# Patient Record
Sex: MALE | Race: OTHER | HISPANIC OR LATINO | URBAN - METROPOLITAN AREA
[De-identification: names, ages, dates, MRNs, and addresses within clinical notes are randomized per-mention and may not be internally consistent; named-entity substitution may affect disease eponyms.]

---

## 2020-06-25 ENCOUNTER — INPATIENT (INPATIENT)
Facility: HOSPITAL | Age: 25
LOS: 14 days | Discharge: HOME | End: 2020-07-10
Attending: PSYCHIATRY & NEUROLOGY | Admitting: PSYCHIATRY & NEUROLOGY
Payer: MEDICAID

## 2020-06-25 VITALS
TEMPERATURE: 98 F | SYSTOLIC BLOOD PRESSURE: 155 MMHG | OXYGEN SATURATION: 99 % | HEART RATE: 90 BPM | DIASTOLIC BLOOD PRESSURE: 94 MMHG | RESPIRATION RATE: 19 BRPM

## 2020-06-25 DIAGNOSIS — F31.9 BIPOLAR DISORDER, UNSPECIFIED: ICD-10-CM

## 2020-06-25 LAB
ANION GAP SERPL CALC-SCNC: 15 MMO/L — HIGH (ref 7–14)
APAP SERPL-MCNC: < 15 UG/ML — LOW (ref 15–25)
BASOPHILS # BLD AUTO: 0.06 K/UL — SIGNIFICANT CHANGE UP (ref 0–0.2)
BASOPHILS NFR BLD AUTO: 0.5 % — SIGNIFICANT CHANGE UP (ref 0–2)
BUN SERPL-MCNC: 17 MG/DL — SIGNIFICANT CHANGE UP (ref 7–23)
CALCIUM SERPL-MCNC: 9.3 MG/DL — SIGNIFICANT CHANGE UP (ref 8.4–10.5)
CHLORIDE SERPL-SCNC: 100 MMOL/L — SIGNIFICANT CHANGE UP (ref 98–107)
CO2 SERPL-SCNC: 21 MMOL/L — LOW (ref 22–31)
CREAT SERPL-MCNC: 0.95 MG/DL — SIGNIFICANT CHANGE UP (ref 0.5–1.3)
EOSINOPHIL # BLD AUTO: 0.11 K/UL — SIGNIFICANT CHANGE UP (ref 0–0.5)
EOSINOPHIL NFR BLD AUTO: 0.8 % — SIGNIFICANT CHANGE UP (ref 0–6)
ETHANOL BLD-MCNC: < 10 MG/DL — SIGNIFICANT CHANGE UP
GLUCOSE SERPL-MCNC: 148 MG/DL — HIGH (ref 70–99)
HCT VFR BLD CALC: 44.5 % — SIGNIFICANT CHANGE UP (ref 39–50)
HGB BLD-MCNC: 15.6 G/DL — SIGNIFICANT CHANGE UP (ref 13–17)
IMM GRANULOCYTES NFR BLD AUTO: 0.8 % — SIGNIFICANT CHANGE UP (ref 0–1.5)
LYMPHOCYTES # BLD AUTO: 17.8 % — SIGNIFICANT CHANGE UP (ref 13–44)
LYMPHOCYTES # BLD AUTO: 2.31 K/UL — SIGNIFICANT CHANGE UP (ref 1–3.3)
MCHC RBC-ENTMCNC: 32.4 PG — SIGNIFICANT CHANGE UP (ref 27–34)
MCHC RBC-ENTMCNC: 35.1 % — SIGNIFICANT CHANGE UP (ref 32–36)
MCV RBC AUTO: 92.3 FL — SIGNIFICANT CHANGE UP (ref 80–100)
MONOCYTES # BLD AUTO: 1.06 K/UL — HIGH (ref 0–0.9)
MONOCYTES NFR BLD AUTO: 8.2 % — SIGNIFICANT CHANGE UP (ref 2–14)
NEUTROPHILS # BLD AUTO: 9.31 K/UL — HIGH (ref 1.8–7.4)
NEUTROPHILS NFR BLD AUTO: 71.9 % — SIGNIFICANT CHANGE UP (ref 43–77)
NRBC # FLD: 0 K/UL — SIGNIFICANT CHANGE UP (ref 0–0)
PLATELET # BLD AUTO: 134 K/UL — LOW (ref 150–400)
PMV BLD: 12.5 FL — SIGNIFICANT CHANGE UP (ref 7–13)
POTASSIUM SERPL-MCNC: 3.9 MMOL/L — SIGNIFICANT CHANGE UP (ref 3.5–5.3)
POTASSIUM SERPL-SCNC: 3.9 MMOL/L — SIGNIFICANT CHANGE UP (ref 3.5–5.3)
RBC # BLD: 4.82 M/UL — SIGNIFICANT CHANGE UP (ref 4.2–5.8)
RBC # FLD: 12.2 % — SIGNIFICANT CHANGE UP (ref 10.3–14.5)
SALICYLATES SERPL-MCNC: < 5 MG/DL — LOW (ref 15–30)
SODIUM SERPL-SCNC: 136 MMOL/L — SIGNIFICANT CHANGE UP (ref 135–145)
TSH SERPL-MCNC: 1.37 UIU/ML — SIGNIFICANT CHANGE UP (ref 0.27–4.2)
WBC # BLD: 12.95 K/UL — HIGH (ref 3.8–10.5)
WBC # FLD AUTO: 12.95 K/UL — HIGH (ref 3.8–10.5)

## 2020-06-25 PROCEDURE — 99285 EMERGENCY DEPT VISIT HI MDM: CPT

## 2020-06-25 RX ORDER — HALOPERIDOL DECANOATE 100 MG/ML
5 INJECTION INTRAMUSCULAR EVERY 6 HOURS
Refills: 0 | Status: DISCONTINUED | OUTPATIENT
Start: 2020-06-26 | End: 2020-07-10

## 2020-06-25 RX ORDER — HALOPERIDOL DECANOATE 100 MG/ML
5 INJECTION INTRAMUSCULAR ONCE
Refills: 0 | Status: COMPLETED | OUTPATIENT
Start: 2020-06-25 | End: 2020-06-25

## 2020-06-25 RX ORDER — DIPHENHYDRAMINE HCL 50 MG
50 CAPSULE ORAL EVERY 6 HOURS
Refills: 0 | Status: DISCONTINUED | OUTPATIENT
Start: 2020-06-26 | End: 2020-07-10

## 2020-06-25 RX ORDER — TETANUS TOXOID, REDUCED DIPHTHERIA TOXOID AND ACELLULAR PERTUSSIS VACCINE, ADSORBED 5; 2.5; 8; 8; 2.5 [IU]/.5ML; [IU]/.5ML; UG/.5ML; UG/.5ML; UG/.5ML
0.5 SUSPENSION INTRAMUSCULAR ONCE
Refills: 0 | Status: COMPLETED | OUTPATIENT
Start: 2020-06-25 | End: 2020-06-25

## 2020-06-25 RX ORDER — DIPHENHYDRAMINE HCL 50 MG
50 CAPSULE ORAL ONCE
Refills: 0 | Status: DISCONTINUED | OUTPATIENT
Start: 2020-06-26 | End: 2020-07-10

## 2020-06-25 RX ORDER — HALOPERIDOL DECANOATE 100 MG/ML
5 INJECTION INTRAMUSCULAR ONCE
Refills: 0 | Status: DISCONTINUED | OUTPATIENT
Start: 2020-06-26 | End: 2020-07-10

## 2020-06-25 RX ADMIN — TETANUS TOXOID, REDUCED DIPHTHERIA TOXOID AND ACELLULAR PERTUSSIS VACCINE, ADSORBED 0.5 MILLILITER(S): 5; 2.5; 8; 8; 2.5 SUSPENSION INTRAMUSCULAR at 19:09

## 2020-06-25 RX ADMIN — HALOPERIDOL DECANOATE 5 MILLIGRAM(S): 100 INJECTION INTRAMUSCULAR at 17:30

## 2020-06-25 NOTE — ED PROVIDER NOTE - SKIN COLOR
Multiple (6+) areas of skin abrasion/excoriation/superficial laceration over bilateral arms and 1 to R lateral thigh with various shapes/designs.  No active bleeding, crusting of small volumes of blood c/w recent active ooze.  No areas of sig depth warranting suture.  PT also has 2 linear wounds to R brow overlying R brow with extension to ~0.5 cm inferior to brow without involvement of superior lid.  EOMI.

## 2020-06-25 NOTE — ED PROVIDER NOTE - CARE PLAN
Principal Discharge DX:	Behavior concern in adult Principal Discharge DX:	Behavior concern in adult  Secondary Diagnosis:	Psychosis
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
## 2020-06-25 NOTE — ED BEHAVIORAL HEALTH NOTE - BEHAVIORAL HEALTH NOTE
Worker called patient’s father Gabriel Sykes ([phone]) for collateral information. All information is as per Mr. 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 Mr. Sykes states that the patient lives alone in a rented room and then recently reconnected with the family 1 year ago. He states prior to this the patient was away from the family for three years because he was drinking alcohol and smoking marijuana with friends and was caught up in the wrong company. Father states that the patient was hospitalized in January at [facility] for a 2 week admission for inpatient psych. Father States at that time the patient was acting bizarre and was disagreeing with all the family members in the house. Father states in February the patient moved into the room. He states that the patient is currently not mentally stable and needs to be on medication. He states that the patient is not connected to any outpatient services and is not on medication. He states that the patient has been recently displaying a change in moods. He states that about 16 days ago the patient asked him, “why were you sending people to kill me?”. He states that the patient reported hearing voices every night and that he is seeing people around his room. He states that the patient is telling him that he is seeing family members who are not in the country. He states that the patient is increasingly paranoid about people. He states that the patient has been laughing to himself and talking to himself. He states that the patient is not aggressive or violent. He states that the patient cut his upper left eye and nose and was cutting numbers in his hand. Father questioned him doing this and the patient responded that was his magic number. Father states that the patient does not trust anyone. He states that he told the patient to stop doing this and abusing his body at that time. He states he takes the patient breakfast and lunch every day. He states that he brought the patient breakfast today at 9am and noticed the patient’s finger was bleeding. He states that he told the patient to stop hurting himself. He states that when he later returned to bring him lunch the patient cut his entire body. He states that he could not believe what the patient had done to his body. He states that the patient cut himself on his face, arms, and legs. He states he then activated 911. He believes the patient needs to be on medication and is current danger to himself. Father states that he patient saw a counselor in high school. He reports that the patient smokes cigarettes and marijuana. Father denies that the patient mentioned suicidal statements today.  He states that the patient’s brother also has bipolar disorder. He denies that the patient has legal issues or medical problems. He denies that the patient has a SA. He states that no one besides him in the family knows what happened today.  Case discussed with Dr. Brandon. Patient will be admitted to L[facility]. Worker informed father of patient admission to L6 and provided unit numbers. medical clearance pending.

## 2020-06-25 NOTE — ED PROVIDER NOTE - UNABLE TO OBTAIN
Unresponsive Hx as able to be obtained in HPI, pt otherwise avoidant, please refer to HPI for what was able to be obtained

## 2020-06-25 NOTE — ED ADULT NURSE NOTE - OBJECTIVE STATEMENT
Pt arrives agitated and oppositional, meds given as ordered.  Father called 911 and pt arrives in handcuffs for pt cutting himself and is suicidal.  Pt calmed with meds and agreed to testing.  States he's going through a lot of stuff but unwilling to say.  Meal provided.  Emotional support given.  Safety precautions taken.  Belongings secured.  Pt with multiple superficial wounds all over body.  No active bleeding.

## 2020-06-25 NOTE — ED BEHAVIORAL HEALTH ASSESSMENT NOTE - SUMMARY
Patient is a 25 year old Maltese man, with past diagnosis of mood disorder, ADHD, Oppositional defiant disorder, Disruptive behavior disorder, THC dependence, Bipolar disorder, MDD, Panic disorder, with one hospitalization from [facility] in Jan 2020, no history of suicide attempts, recent history of self mutilation, not currently in treatment, who presents after worsening self mutilation and internal preoccupation.     Patient is an acute danger to self and others at this time.  Patient lacks insight and judgment into illness and remains an acute safety risk and warrants inpatient psychiatric hospitalization for safety and stabilization.

## 2020-06-25 NOTE — ED BEHAVIORAL HEALTH ASSESSMENT NOTE - DESCRIPTION
Patient was pacing, intrusive, did not exhibit any aggression. Patient did not require any physical restraints.     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

## 2020-06-25 NOTE — ED PROVIDER NOTE - PROGRESS NOTE DETAILS
Attending MD Verde.  PT endorsed to ALFREDO Robbins pending psych recs in stable condition. Patient received from Dr. Verde.  Patient stable, pending recs.  Calm and cooperative. DD ED ATTG:  rec'd s/o from Dr Verde - 25M cut with glass excoriations.  tba .  .  d/w Dr Gale - OK to admit pt to low 6 pending covid swab result.

## 2020-06-25 NOTE — ED PROVIDER NOTE - OBJECTIVE STATEMENT
Attending MD Verde.  Pt is a 26 yo male with reported pmhx of bipolar disorder for which he states he is supposed to be on medication but doesn't know what and states that he hasn't taken anything in at least 1 mo but unsure how long.  Per pt he doesn't need to be here but dad called.  Pt with visible areas of skin scratching/abrasion/superifcial laceration in various designs with acute on chronic appearance with some dried blood c/w recent active low volume ooze, areas are dry, no active bleeding currently.  Areas in question on dorsum of bilateral arms and to R brow without involvement of the R eye.  Pt endorses additional site to R thigh but is in hallway on initial exam. Pt denies any desire or attempt to harm self in the past and denies any desire or attempt to harm anyone else previously.  PT states that current skin findings are from his 'art' not an attempt at self-harm.  Pt with some psychomotor agitation, pacing during evaluation.  Denies any drug use/tobacco use/medication.  Denies any other medical problems.  Current presentation not c/w intoxication but rather c/w paranoid/avoidant possible brooks.  Pt denies recent fevers/chills/cough/congestion, states he has not had COVID to his knowledge.  Pt does not known when he last had a tetanus shot.  Police state that wounds were made by pt cutting himself with a broken piece of glass.

## 2020-06-25 NOTE — ED BEHAVIORAL HEALTH ASSESSMENT NOTE - SUICIDE PROTECTIVE FACTORS
Supportive social network of family or friends/Engaged in work or school/Identifies reasons for living/Has future plans

## 2020-06-25 NOTE — ED ADULT NURSE NOTE - NSIMPLEMENTINTERV_GEN_ALL_ED
Implemented All Fall Risk Interventions:  Waddy to call system. Call bell, personal items and telephone within reach. Instruct patient to call for assistance. Room bathroom lighting operational. Non-slip footwear when patient is off stretcher. Physically safe environment: no spills, clutter or unnecessary equipment. Stretcher in lowest position, wheels locked, appropriate side rails in place. Provide visual cue, wrist band, yellow gown, etc. Monitor gait and stability. Monitor for mental status changes and reorient to person, place, and time. Review medications for side effects contributing to fall risk. Reinforce activity limits and safety measures with patient and family.

## 2020-06-25 NOTE — ED BEHAVIORAL HEALTH ASSESSMENT NOTE - OTHER PAST PSYCHIATRIC HISTORY (INCLUDE DETAILS REGARDING ONSET, COURSE OF ILLNESS, INPATIENT/OUTPATIENT TREATMENT)
1 inpatient hospitalization [facility] 2/20/2019 to 2/25/2019 and [facility] 3/2/2019 to 3/8/2019  [place] [facility] [place]  per claudio, previous treatment with psychiatry in 2019

## 2020-06-25 NOTE — ED PROVIDER NOTE - CLINICAL SUMMARY MEDICAL DECISION MAKING FREE TEXT BOX
Attending MD Verde.  PT with avoidant, paranoid behavior and psychomotor agitation with reported hx of bipolar disorder and med non-compliance.  Psych consult.  Psych clearance labs ordered and pt to receive tdap.  No lac repair warranted at this time.  Haldol ordered for ongoing psychomotor agitation in behavioral health.

## 2020-06-25 NOTE — ED ADULT TRIAGE NOTE - CHIEF COMPLAINT QUOTE
Patient brought to ER by EMS accompanied by MARI from home after father called to say that he has been cutting himself and suicidal. Pt has a bipolar history and has superficial wounds to bilateral arms and knees.

## 2020-06-26 DIAGNOSIS — F69 UNSPECIFIED DISORDER OF ADULT PERSONALITY AND BEHAVIOR: ICD-10-CM

## 2020-06-26 LAB
AMPHET UR-MCNC: NEGATIVE — SIGNIFICANT CHANGE UP
APPEARANCE UR: CLEAR — SIGNIFICANT CHANGE UP
BARBITURATES UR SCN-MCNC: NEGATIVE — SIGNIFICANT CHANGE UP
BENZODIAZ UR-MCNC: NEGATIVE — SIGNIFICANT CHANGE UP
BILIRUB UR-MCNC: NEGATIVE — SIGNIFICANT CHANGE UP
BLOOD UR QL VISUAL: NEGATIVE — SIGNIFICANT CHANGE UP
CANNABINOIDS UR-MCNC: NEGATIVE — SIGNIFICANT CHANGE UP
COCAINE METAB.OTHER UR-MCNC: NEGATIVE — SIGNIFICANT CHANGE UP
COLOR SPEC: SIGNIFICANT CHANGE UP
GLUCOSE UR-MCNC: NEGATIVE — SIGNIFICANT CHANGE UP
KETONES UR-MCNC: NEGATIVE — SIGNIFICANT CHANGE UP
LEUKOCYTE ESTERASE UR-ACNC: NEGATIVE — SIGNIFICANT CHANGE UP
METHADONE UR-MCNC: NEGATIVE — SIGNIFICANT CHANGE UP
NITRITE UR-MCNC: NEGATIVE — SIGNIFICANT CHANGE UP
OPIATES UR-MCNC: NEGATIVE — SIGNIFICANT CHANGE UP
OXYCODONE UR-MCNC: NEGATIVE — SIGNIFICANT CHANGE UP
PCP UR-MCNC: NEGATIVE — SIGNIFICANT CHANGE UP
PH UR: 6.5 — SIGNIFICANT CHANGE UP (ref 5–8)
PROT UR-MCNC: NEGATIVE — SIGNIFICANT CHANGE UP
SARS-COV-2 RNA SPEC QL NAA+PROBE: SIGNIFICANT CHANGE UP
SP GR SPEC: 1.01 — SIGNIFICANT CHANGE UP (ref 1–1.04)
UROBILINOGEN FLD QL: NORMAL — SIGNIFICANT CHANGE UP

## 2020-06-26 RX ORDER — RISPERIDONE 4 MG/1
2 TABLET ORAL AT BEDTIME
Refills: 0 | Status: DISCONTINUED | OUTPATIENT
Start: 2020-06-26 | End: 2020-06-28

## 2020-06-26 RX ADMIN — RISPERIDONE 2 MILLIGRAM(S): 4 TABLET ORAL at 21:11

## 2020-06-26 NOTE — ED ADULT NURSE REASSESSMENT NOTE - NS ED NURSE REASSESS COMMENT FT1
pt calm & cooperative denies si/hi/avh presently made aware of admission to  low 6, pt transported via EMS.

## 2020-06-26 NOTE — ED ADULT NURSE REASSESSMENT NOTE - GENERAL PATIENT STATE
pt observed laying in bed at this time. NAD, even unlabored resp observed. When awake, patient paces unit, looks at clock and appears internally preoccupied, minimally verbal but without agitation

## 2020-06-26 NOTE — ED ADULT NURSE REASSESSMENT NOTE - NS ED NURSE REASSESS COMMENT FT1
Received report from night RN pt calm & cooperative denies si/hi/avh presently pt cleared by psych for admission to low 6, pending Covid results safety & comfort measures maintained eval on going.

## 2020-06-26 NOTE — ED ADULT NURSE REASSESSMENT NOTE - NS ED NURSE REASSESS COMMENT FT1
FACILITATOR RN: Pt. a&ox4, resting in stretcher, appears comfortable. Respirations appear even and unlabored. VS as noted. NAD. Will continue to monitor.

## 2020-06-27 PROCEDURE — 99222 1ST HOSP IP/OBS MODERATE 55: CPT

## 2020-06-27 RX ADMIN — RISPERIDONE 2 MILLIGRAM(S): 4 TABLET ORAL at 20:29

## 2020-06-28 PROCEDURE — 99232 SBSQ HOSP IP/OBS MODERATE 35: CPT

## 2020-06-28 RX ORDER — RISPERIDONE 4 MG/1
3 TABLET ORAL AT BEDTIME
Refills: 0 | Status: DISCONTINUED | OUTPATIENT
Start: 2020-06-28 | End: 2020-06-30

## 2020-06-28 RX ORDER — BENZOCAINE AND MENTHOL 5; 1 G/100ML; G/100ML
1 LIQUID ORAL
Refills: 0 | Status: DISCONTINUED | OUTPATIENT
Start: 2020-06-28 | End: 2020-07-10

## 2020-06-28 RX ADMIN — BENZOCAINE AND MENTHOL 1 LOZENGE: 5; 1 LIQUID ORAL at 05:23

## 2020-06-28 RX ADMIN — Medication 100 MILLIGRAM(S): at 13:04

## 2020-06-28 RX ADMIN — Medication 100 MILLIGRAM(S): at 21:08

## 2020-06-28 RX ADMIN — RISPERIDONE 3 MILLIGRAM(S): 4 TABLET ORAL at 21:08

## 2020-06-29 PROCEDURE — 99232 SBSQ HOSP IP/OBS MODERATE 35: CPT

## 2020-06-29 RX ORDER — BACITRACIN ZINC 500 UNIT/G
1 OINTMENT IN PACKET (EA) TOPICAL
Refills: 0 | Status: DISCONTINUED | OUTPATIENT
Start: 2020-06-29 | End: 2020-07-10

## 2020-06-29 RX ADMIN — RISPERIDONE 3 MILLIGRAM(S): 4 TABLET ORAL at 21:49

## 2020-06-29 RX ADMIN — Medication 1 APPLICATION(S): at 21:49

## 2020-06-30 PROCEDURE — 99232 SBSQ HOSP IP/OBS MODERATE 35: CPT

## 2020-06-30 RX ORDER — RISPERIDONE 4 MG/1
4 TABLET ORAL AT BEDTIME
Refills: 0 | Status: DISCONTINUED | OUTPATIENT
Start: 2020-06-30 | End: 2020-07-02

## 2020-06-30 RX ADMIN — Medication 1 APPLICATION(S): at 10:44

## 2020-06-30 RX ADMIN — Medication 1 APPLICATION(S): at 20:10

## 2020-06-30 RX ADMIN — RISPERIDONE 4 MILLIGRAM(S): 4 TABLET ORAL at 20:30

## 2020-07-01 PROCEDURE — 99232 SBSQ HOSP IP/OBS MODERATE 35: CPT

## 2020-07-01 RX ADMIN — RISPERIDONE 4 MILLIGRAM(S): 4 TABLET ORAL at 20:11

## 2020-07-01 RX ADMIN — Medication 1 APPLICATION(S): at 10:19

## 2020-07-01 RX ADMIN — Medication 1 APPLICATION(S): at 20:11

## 2020-07-02 PROCEDURE — 99232 SBSQ HOSP IP/OBS MODERATE 35: CPT

## 2020-07-02 RX ORDER — RISPERIDONE 4 MG/1
5 TABLET ORAL AT BEDTIME
Refills: 0 | Status: DISCONTINUED | OUTPATIENT
Start: 2020-07-02 | End: 2020-07-06

## 2020-07-02 RX ORDER — LANOLIN ALCOHOL/MO/W.PET/CERES
3 CREAM (GRAM) TOPICAL AT BEDTIME
Refills: 0 | Status: DISCONTINUED | OUTPATIENT
Start: 2020-07-02 | End: 2020-07-10

## 2020-07-02 RX ADMIN — Medication 1 APPLICATION(S): at 10:29

## 2020-07-02 RX ADMIN — BENZOCAINE AND MENTHOL 1 LOZENGE: 5; 1 LIQUID ORAL at 10:29

## 2020-07-02 RX ADMIN — RISPERIDONE 5 MILLIGRAM(S): 4 TABLET ORAL at 21:59

## 2020-07-02 RX ADMIN — Medication 1 APPLICATION(S): at 21:59

## 2020-07-03 RX ADMIN — RISPERIDONE 5 MILLIGRAM(S): 4 TABLET ORAL at 21:30

## 2020-07-03 RX ADMIN — Medication 1 APPLICATION(S): at 21:30

## 2020-07-03 RX ADMIN — Medication 1 APPLICATION(S): at 09:29

## 2020-07-04 RX ADMIN — BENZOCAINE AND MENTHOL 1 LOZENGE: 5; 1 LIQUID ORAL at 08:48

## 2020-07-04 RX ADMIN — Medication 1 APPLICATION(S): at 08:47

## 2020-07-04 RX ADMIN — Medication 1 APPLICATION(S): at 20:59

## 2020-07-04 RX ADMIN — RISPERIDONE 5 MILLIGRAM(S): 4 TABLET ORAL at 20:59

## 2020-07-05 RX ADMIN — Medication 1 APPLICATION(S): at 20:54

## 2020-07-05 RX ADMIN — Medication 1 APPLICATION(S): at 09:39

## 2020-07-05 RX ADMIN — RISPERIDONE 5 MILLIGRAM(S): 4 TABLET ORAL at 20:54

## 2020-07-06 PROCEDURE — 99232 SBSQ HOSP IP/OBS MODERATE 35: CPT

## 2020-07-06 RX ORDER — RISPERIDONE 4 MG/1
6 TABLET ORAL AT BEDTIME
Refills: 0 | Status: DISCONTINUED | OUTPATIENT
Start: 2020-07-06 | End: 2020-07-10

## 2020-07-06 RX ADMIN — Medication 1 APPLICATION(S): at 21:15

## 2020-07-06 RX ADMIN — RISPERIDONE 6 MILLIGRAM(S): 4 TABLET ORAL at 21:15

## 2020-07-06 RX ADMIN — Medication 1 APPLICATION(S): at 09:12

## 2020-07-07 PROCEDURE — 99232 SBSQ HOSP IP/OBS MODERATE 35: CPT

## 2020-07-07 RX ADMIN — Medication 1 APPLICATION(S): at 12:09

## 2020-07-07 RX ADMIN — Medication 1 APPLICATION(S): at 21:25

## 2020-07-07 RX ADMIN — Medication 100 MILLIGRAM(S): at 21:27

## 2020-07-07 RX ADMIN — RISPERIDONE 6 MILLIGRAM(S): 4 TABLET ORAL at 21:24

## 2020-07-08 PROCEDURE — 99232 SBSQ HOSP IP/OBS MODERATE 35: CPT

## 2020-07-08 RX ADMIN — Medication 1 APPLICATION(S): at 09:17

## 2020-07-08 RX ADMIN — Medication 1 APPLICATION(S): at 21:20

## 2020-07-08 RX ADMIN — RISPERIDONE 6 MILLIGRAM(S): 4 TABLET ORAL at 21:20

## 2020-07-09 PROCEDURE — 99232 SBSQ HOSP IP/OBS MODERATE 35: CPT

## 2020-07-09 RX ORDER — RISPERIDONE 4 MG/1
2 TABLET ORAL
Qty: 60 | Refills: 0
Start: 2020-07-09

## 2020-07-09 RX ADMIN — BENZOCAINE AND MENTHOL 1 LOZENGE: 5; 1 LIQUID ORAL at 09:33

## 2020-07-09 RX ADMIN — Medication 3 MILLIGRAM(S): at 21:06

## 2020-07-09 RX ADMIN — RISPERIDONE 6 MILLIGRAM(S): 4 TABLET ORAL at 20:54

## 2020-07-09 RX ADMIN — Medication 1 APPLICATION(S): at 09:33

## 2020-07-09 RX ADMIN — Medication 50 MILLIGRAM(S): at 21:06

## 2020-07-09 RX ADMIN — Medication 1 APPLICATION(S): at 20:54

## 2020-07-10 VITALS — TEMPERATURE: 97 F

## 2020-07-10 RX ADMIN — Medication 1 APPLICATION(S): at 08:44

## 2020-07-10 RX ADMIN — BENZOCAINE AND MENTHOL 1 LOZENGE: 5; 1 LIQUID ORAL at 08:45

## 2020-07-10 RX ADMIN — HALOPERIDOL DECANOATE 5 MILLIGRAM(S): 100 INJECTION INTRAMUSCULAR at 08:44

## 2020-07-13 PROBLEM — Z78.9 OTHER SPECIFIED HEALTH STATUS: Chronic | Status: ACTIVE | Noted: 2020-06-25

## 2020-07-15 ENCOUNTER — OUTPATIENT (OUTPATIENT)
Dept: OUTPATIENT SERVICES | Facility: HOSPITAL | Age: 25
LOS: 1 days | Discharge: STILL ADMITTED OR ADMINISTRATIVE | End: 2020-07-15

## 2021-01-14 ENCOUNTER — INPATIENT (INPATIENT)
Facility: HOSPITAL | Age: 26
LOS: 26 days | Discharge: HOME | End: 2021-02-10
Attending: PSYCHIATRY & NEUROLOGY | Admitting: PSYCHIATRY & NEUROLOGY
Payer: MEDICAID

## 2021-01-14 VITALS
SYSTOLIC BLOOD PRESSURE: 159 MMHG | DIASTOLIC BLOOD PRESSURE: 98 MMHG | HEART RATE: 93 BPM | RESPIRATION RATE: 18 BRPM | OXYGEN SATURATION: 100 % | TEMPERATURE: 98 F

## 2021-01-14 DIAGNOSIS — F31.9 BIPOLAR DISORDER, UNSPECIFIED: ICD-10-CM

## 2021-01-14 LAB
ALBUMIN SERPL ELPH-MCNC: 4.6 G/DL — SIGNIFICANT CHANGE UP (ref 3.3–5)
ALP SERPL-CCNC: 78 U/L — SIGNIFICANT CHANGE UP (ref 40–120)
ALT FLD-CCNC: 13 U/L — SIGNIFICANT CHANGE UP (ref 4–41)
ANION GAP SERPL CALC-SCNC: 12 MMOL/L — SIGNIFICANT CHANGE UP (ref 7–14)
APPEARANCE UR: CLEAR — SIGNIFICANT CHANGE UP
AST SERPL-CCNC: 19 U/L — SIGNIFICANT CHANGE UP (ref 4–40)
BASOPHILS # BLD AUTO: 0.08 K/UL — SIGNIFICANT CHANGE UP (ref 0–0.2)
BASOPHILS NFR BLD AUTO: 0.7 % — SIGNIFICANT CHANGE UP (ref 0–2)
BILIRUB SERPL-MCNC: 0.4 MG/DL — SIGNIFICANT CHANGE UP (ref 0.2–1.2)
BILIRUB UR-MCNC: NEGATIVE — SIGNIFICANT CHANGE UP
BUN SERPL-MCNC: 12 MG/DL — SIGNIFICANT CHANGE UP (ref 7–23)
CALCIUM SERPL-MCNC: 9.7 MG/DL — SIGNIFICANT CHANGE UP (ref 8.4–10.5)
CHLORIDE SERPL-SCNC: 103 MMOL/L — SIGNIFICANT CHANGE UP (ref 98–107)
CO2 SERPL-SCNC: 26 MMOL/L — SIGNIFICANT CHANGE UP (ref 22–31)
COLOR SPEC: SIGNIFICANT CHANGE UP
CREAT SERPL-MCNC: 0.8 MG/DL — SIGNIFICANT CHANGE UP (ref 0.5–1.3)
DIFF PNL FLD: NEGATIVE — SIGNIFICANT CHANGE UP
EOSINOPHIL # BLD AUTO: 0.08 K/UL — SIGNIFICANT CHANGE UP (ref 0–0.5)
EOSINOPHIL NFR BLD AUTO: 0.7 % — SIGNIFICANT CHANGE UP (ref 0–6)
GLUCOSE SERPL-MCNC: 94 MG/DL — SIGNIFICANT CHANGE UP (ref 70–99)
GLUCOSE UR QL: NEGATIVE — SIGNIFICANT CHANGE UP
HCT VFR BLD CALC: 49.8 % — SIGNIFICANT CHANGE UP (ref 39–50)
HGB BLD-MCNC: 16.5 G/DL — SIGNIFICANT CHANGE UP (ref 13–17)
IANC: 7.27 K/UL — SIGNIFICANT CHANGE UP (ref 1.5–8.5)
IMM GRANULOCYTES NFR BLD AUTO: 0.4 % — SIGNIFICANT CHANGE UP (ref 0–1.5)
KETONES UR-MCNC: NEGATIVE — SIGNIFICANT CHANGE UP
LEUKOCYTE ESTERASE UR-ACNC: NEGATIVE — SIGNIFICANT CHANGE UP
LYMPHOCYTES # BLD AUTO: 2.83 K/UL — SIGNIFICANT CHANGE UP (ref 1–3.3)
LYMPHOCYTES # BLD AUTO: 25 % — SIGNIFICANT CHANGE UP (ref 13–44)
MCHC RBC-ENTMCNC: 30.8 PG — SIGNIFICANT CHANGE UP (ref 27–34)
MCHC RBC-ENTMCNC: 33.1 GM/DL — SIGNIFICANT CHANGE UP (ref 32–36)
MCV RBC AUTO: 93.1 FL — SIGNIFICANT CHANGE UP (ref 80–100)
MONOCYTES # BLD AUTO: 1.01 K/UL — HIGH (ref 0–0.9)
MONOCYTES NFR BLD AUTO: 8.9 % — SIGNIFICANT CHANGE UP (ref 2–14)
NEUTROPHILS # BLD AUTO: 7.27 K/UL — SIGNIFICANT CHANGE UP (ref 1.8–7.4)
NEUTROPHILS NFR BLD AUTO: 64.3 % — SIGNIFICANT CHANGE UP (ref 43–77)
NITRITE UR-MCNC: NEGATIVE — SIGNIFICANT CHANGE UP
NRBC # BLD: 0 /100 WBCS — SIGNIFICANT CHANGE UP
NRBC # FLD: 0 K/UL — SIGNIFICANT CHANGE UP
PCP SPEC-MCNC: SIGNIFICANT CHANGE UP
PH UR: 7.5 — SIGNIFICANT CHANGE UP (ref 5–8)
PLATELET # BLD AUTO: 124 K/UL — LOW (ref 150–400)
POTASSIUM SERPL-MCNC: 4.7 MMOL/L — SIGNIFICANT CHANGE UP (ref 3.5–5.3)
POTASSIUM SERPL-SCNC: 4.7 MMOL/L — SIGNIFICANT CHANGE UP (ref 3.5–5.3)
PROT SERPL-MCNC: 7.5 G/DL — SIGNIFICANT CHANGE UP (ref 6–8.3)
PROT UR-MCNC: ABNORMAL
RBC # BLD: 5.35 M/UL — SIGNIFICANT CHANGE UP (ref 4.2–5.8)
RBC # FLD: 11.9 % — SIGNIFICANT CHANGE UP (ref 10.3–14.5)
SARS-COV-2 RNA SPEC QL NAA+PROBE: SIGNIFICANT CHANGE UP
SODIUM SERPL-SCNC: 141 MMOL/L — SIGNIFICANT CHANGE UP (ref 135–145)
SP GR SPEC: 1.02 — SIGNIFICANT CHANGE UP (ref 1.01–1.02)
TOXICOLOGY SCREEN, DRUGS OF ABUSE, SERUM RESULT: SIGNIFICANT CHANGE UP
TSH SERPL-MCNC: 0.53 UIU/ML — SIGNIFICANT CHANGE UP (ref 0.27–4.2)
UROBILINOGEN FLD QL: SIGNIFICANT CHANGE UP
WBC # BLD: 11.31 K/UL — HIGH (ref 3.8–10.5)
WBC # FLD AUTO: 11.31 K/UL — HIGH (ref 3.8–10.5)

## 2021-01-14 PROCEDURE — 99285 EMERGENCY DEPT VISIT HI MDM: CPT | Mod: GC

## 2021-01-14 PROCEDURE — 99283 EMERGENCY DEPT VISIT LOW MDM: CPT

## 2021-01-14 RX ORDER — DIPHENHYDRAMINE HCL 50 MG
50 CAPSULE ORAL EVERY 6 HOURS
Refills: 0 | Status: DISCONTINUED | OUTPATIENT
Start: 2021-01-14 | End: 2021-01-15

## 2021-01-14 RX ORDER — HALOPERIDOL DECANOATE 100 MG/ML
5 INJECTION INTRAMUSCULAR ONCE
Refills: 0 | Status: DISCONTINUED | OUTPATIENT
Start: 2021-01-14 | End: 2021-01-15

## 2021-01-14 RX ORDER — RISPERIDONE 4 MG/1
1 TABLET ORAL
Refills: 0 | Status: DISCONTINUED | OUTPATIENT
Start: 2021-01-14 | End: 2021-01-15

## 2021-01-14 RX ORDER — BENZTROPINE MESYLATE 1 MG
0.5 TABLET ORAL
Refills: 0 | Status: DISCONTINUED | OUTPATIENT
Start: 2021-01-14 | End: 2021-01-20

## 2021-01-14 RX ORDER — HALOPERIDOL DECANOATE 100 MG/ML
5 INJECTION INTRAMUSCULAR EVERY 6 HOURS
Refills: 0 | Status: DISCONTINUED | OUTPATIENT
Start: 2021-01-14 | End: 2021-01-15

## 2021-01-14 RX ADMIN — HALOPERIDOL DECANOATE 5 MILLIGRAM(S): 100 INJECTION INTRAMUSCULAR at 21:19

## 2021-01-14 RX ADMIN — Medication 2 MILLIGRAM(S): at 21:19

## 2021-01-14 NOTE — ED BEHAVIORAL HEALTH ASSESSMENT NOTE - DETAILS
hx and recent threatening/menacing behavior but no actual physical violence Father aware of plan none as per dad YOANNA left for Dr. Perla
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
## 2021-01-14 NOTE — ED BEHAVIORAL HEALTH ASSESSMENT NOTE - SUMMARY
Pt is 25 year old man, unemployed, currently domiciled alone in hotel, with pphx of mood disorder, ADHD, Oppositional defiant disorder, Disruptive behavior disorder, THC dependence, Bipolar disorder, MDD, Panic disorder, and psychosis with two hospitalizations from [facility] in Jan 2020 and [facility] in June 2020, no history of suicide attempts, presents to the hospital after increasingly strange behavior and threatening to kill father. As per father's history, patient's behavior is increasingly , demonstrating emotional lability, paranoia and hallucinations. Pt has hx of SIB, and had an another episode last night. No hx of SA/HA. According to father, he is non-adherent to medication or psychiatric follow-up. Patient is a 25 year old Azeri man, lives with family but has been staying at a hotel for the last 2 weeks, unemployed, with documented past psychiatric diagnoses of mood disorder, ADHD, Oppositional defiant disorder, Disruptive behavior disorder, THC dependence, Bipolar disorder, MDD, Panic disorder, and psychosis with two hospitalizations from [facility] in Jan 2020 and [facility] in June 2020, no history of suicide attempts, presents to the hospital after increasingly strange behavior and threatening to kill father. As per father's collateral information, patient's behavior is increasingly bizarre, demonstrating emotional lability, reporting paranoid delusions and was seen talking to self. Pt has hx of self injuring while psychotic. No hx of SA/HA at this time. According to father, he is non-adherent to medication or psychiatric follow-up and at this time given active symptoms of brooks and psychosis pt would benefit from rehospitalization on 9.39 status.

## 2021-01-14 NOTE — ED BEHAVIORAL HEALTH ASSESSMENT NOTE - OTHER PAST PSYCHIATRIC HISTORY (INCLUDE DETAILS REGARDING ONSET, COURSE OF ILLNESS, INPATIENT/OUTPATIENT TREATMENT)
Pt was hospitalized twice. most recently at [facility] in 06/2020.   not currently adherent or following up with treatment
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
## 2021-01-14 NOTE — ED BEHAVIORAL HEALTH ASSESSMENT NOTE - NSCURPASTPSYDX_PSY_ALL_CORE
Mood disorder/Psychotic disorder/ADHD/Alcohol/Substance Use disorders Mood disorder/Psychotic disorder/Alcohol/Substance Use disorders

## 2021-01-14 NOTE — ED BEHAVIORAL HEALTH ASSESSMENT NOTE - RISK ASSESSMENT
Acute: psychosis, threats of aggression/ violence  Chronic: male, hx of hospitalizations, hx of mood disorder  Protective factors: support of father, no hx of SA/HA, no recent substance use Unable to determine Suicide Risk Moderate Acute Suicide Risk

## 2021-01-14 NOTE — ED BEHAVIORAL HEALTH ASSESSMENT NOTE - VIOLENCE RISK FACTORS:
Violent ideation/threat/speech/Irritability Violent ideation/threat/speech/Noncompliance with treatment/Irritability

## 2021-01-14 NOTE — ED BEHAVIORAL HEALTH ASSESSMENT NOTE - PSYCHIATRIC ISSUES AND PLAN (INCLUDE STANDING AND PRN MEDICATION)
Restart Risperdal at 1mg po bid, PRNS: Haldol 5mg po/im q6hrs, Ativan 2mg po/im Q6hrS prn, Benadryl 50mg po/im q6hrs PRN

## 2021-01-14 NOTE — ED BEHAVIORAL HEALTH NOTE - BEHAVIORAL HEALTH NOTE
Worker called patient’s father Youseyou Sykes ([phone]) for collateral information. All information is as per Mr. Sykes:  	  Patient is a 25 year old male, domiciled currently in a hotel for the last week or two, with a diagnosis of Bipolar disorder, unemployed, BIBEMS activated by father aggressive behavior and bizarre behavior. Mr. Sykes states that previously the patient was living in a rented room but he decided to take the patient home to his residence the beginning of January. Father states that when the patient came home his siblings who are 25 years old told him that patient could not stay there due to his aggression and strange behaviors.  Mr. Sykes states he is the only one in the patient's family who is involved in his care. Mr. Sykes states that the patient has been acting bizarre and aggressive since January 1. He states that he took the patient to a hotel to stay. Father states that the patient was admitted in June 2020 to [facility] and discharged in July. Father states he was prescribed Risperdal 3mg twice daily and only took 20 pills when he was discharged and has been non-compliant since. He states that the patient refuses to take medication and refuses to see a psychiatrist. He states that the patient has been acting strange and has been getting violent. Father states that he tried to convince the patient to go to the ED yesterday but the patient became verbally threatening. Today father states he brought the patient to a dinner to eat and the patient started to become angry and told him “listen I’ don’t like the way you are talking and I’ll slap you and Kill you!”. Father states that they took the food to go and then when they left the dinner he tried to call 911 and the patient ran up and snatched the phone from him. Father reports that he was scared and the patient told him to get in the car and drive. Father states that he drove close to the dinner and then saw the police car driving and stopped them. He states that ems was then called. Father states that the patient gestured as if he was going to punch him in the face. Father states that the patient has been presenting worse where he has been paranoid against people. He states that the patient has been talking to himself and laughing to himself. He states that he takes food to the patient every day and notices that the patient whispers to himself.  He states that the patient has a history of smoking marijuana and smoking cigarettes but has not used anything as of lately.     He states that the patient has been recently displaying a change in moods. He states that the patient is increasingly paranoid about people. He states that the patient has a history of engaging in SIB and noticed yesterday he was cutting his finger. Per Father, the patient was not presenting with aggression or violent prior to his psychiatric admission to [facility] and now he is escalating. Father states that he is unsure if the patient is sleeping. He states that since he moved the patient into the hotel he has stayed only in the room. He denies that the patient has legal issues or medical problems. He denies that the patient has a SA. He states that the patient has not tested for covid-19 and has no covid-19 exposure. Father is advocating for admission. He states that the patient is a danger to others at this time. Case discussed with psychiatry. Worker called Central intake for [facility] and obtained bed on 2N. Worker called back patient’s father and informed of patient admission to 2N and provided numbers. Worker informed that patient is pending medical clearance and once cleared he will go to unit. No visiting hours at this time.

## 2021-01-14 NOTE — ED BEHAVIORAL HEALTH ASSESSMENT NOTE - NSBHSATHC_PSY_A_CORE FT
hx of cannabis use but none as per father hx of cannabis use. Father denies recent use but utox+ for thc.

## 2021-01-14 NOTE — ED ADULT TRIAGE NOTE - CHIEF COMPLAINT QUOTE
Patient brought to ER by EMS from [facility] after he was there was hallucinating, talking to himself and things that are not here. Non compliant with medications as per father. Yousef: [phone]

## 2021-01-14 NOTE — ED BEHAVIORAL HEALTH ASSESSMENT NOTE - NSSUICRSKFACTOR_PSY_ALL_CORE
Current and Past Psychiatric Diagnoses/Presenting Symptoms/Treatment Related Factors Current and Past Psychiatric Diagnoses/Presenting Symptoms/Treatment Related Factors/Activating Events/Stressors

## 2021-01-14 NOTE — ED BEHAVIORAL HEALTH ASSESSMENT NOTE - HPI (INCLUDE ILLNESS QUALITY, SEVERITY, DURATION, TIMING, CONTEXT, MODIFYING FACTORS, ASSOCIATED SIGNS AND SYMPTOMS)
Patient is a 25 year old [place]ian man, with past psychiatric diagnoses of mood disorder, ADHD, Oppositional defiant disorder, Disruptive behavior disorder, THC dependence, Bipolar disorder, MDD, Panic disorder, and psychosis with two hospitalizations from [facility] in Jan 2020 and [facility] in June 2020, no history of suicide attempts, presents to the hospital after increasingly strange behavior and threatening to kill father. Pt found to be lying in bed and answers I don't know to questions of what brought him to the hospital and events leading up to it as well as his mood. He says he is not in a good place right now but does not wish to elaborate further. Pt agitated and irritable and acts paranoid and distrustful of interviewer.     Please see  note for additional collateral information obtained by . As per collateral, patient is a 25 year old male, domiciled currently in a hotel for the last week or two following conflict in family home, with a diagnosis of Bipolar disorder, unemployed, BIBEMS activated by father for aggressive behavior and bizarre behavior that began Jan1. Father states he was prescribed Risperdal 3mg twice daily and only took 20 pills when he was discharged and has been non-adherent since. He states that the patient refuses to take medication and refuses to see a psychiatrist. Father states that he tried to convince the patient to go to the ED yesterday but the patient became verbally threatening. Today, father states that the patient made a statement threatening to kill his father after becoming angry. Father was scared and reports pt made gesture that he was going to punch him in the face. Father stopped police on the road and ems was called. Father states that the patient has been presenting worse where he has been paranoid against people and recently has been displaying a change in moods. Father denies prior violent/ aggressive behavior from pt in the past (before July 2020 admission). He states that the patient has been talking to himself and laughing to himself. He notes that he takes food to the patient every day and notices that the patient whispers to himself. Pt has history of self injurious behavior, and according to dad, yesterday cut his finger. He states that the patient has a history of smoking marijuana and smoking cigarettes but has not used anything as of lately. He denies that the patient has legal issues or medical problems. He denies that the patient has a SA. He states that the patient has not tested for covid-19 and has no covid-19 exposure. Father is advocating for admission. He states that the patient is a danger to others at this time. Patient is a 25 year old Nepali man, lives with family but has been staying at a hotel for the last 2 weeks, unemployed, with documented past psychiatric diagnoses of mood disorder, ADHD, Oppositional defiant disorder, Disruptive behavior disorder, THC dependence, Bipolar disorder, MDD, Panic disorder, and psychosis with two hospitalizations from [facility] in Jan 2020 and [facility] in June 2020, no history of suicide attempts, presents to the hospital after increasingly strange behavior and threatening to kill father.     Pt seen in his room, lying down. When questions were asked, patient consistently replied "I don't know". He refused to answer questions about what brought him to the hospital and events leading up to it as well as his mood. He says he is not in a good place right now but does not wish to elaborate further. Pt agitated and irritable and acts paranoid and distrustful of interviewer and multiple times said "I don't want to talk to you". He then started to get agitated so the interview ended.   As patient is a poor historian most information was received from father.      Please see  note for additional collateral information obtained by . As per collateral, patient is a 25 year old male, domiciled currently in a hotel for the last week or two following conflict in family home, with a diagnosis of Bipolar disorder, unemployed, BIBEMS activated by father for aggressive behavior and bizarre behavior that began Jan1. Father states he was prescribed Risperdal 3mg twice daily and only took 20 pills when he was discharged and has been non-adherent since. He states that the patient refuses to take medication and refuses to see a psychiatrist. Father states that he tried to convince the patient to go to the ED yesterday but the patient became verbally threatening. Today, father states that the patient made a statement threatening to kill his father after becoming angry. Father was scared and reports pt made gesture that he was going to punch him in the face. Father stopped police on the road and ems was called. Father states that the patient has been presenting worse where he has been paranoid against people and recently has been displaying a change in moods. Father denies prior violent/ aggressive behavior from pt in the past (before July 2020 admission). He states that the patient has been talking to himself and laughing to himself. He notes that he takes food to the patient every day and notices that the patient whispers to himself. Pt has history of self injurious behavior, and according to dad, yesterday cut his finger. He states that the patient has a history of smoking marijuana and smoking cigarettes but has not used anything as of lately. He denies that the patient has legal issues or medical problems. He denies that the patient has a SA. He states that the patient has not tested for covid-19 and has no covid-19 exposure. Father is advocating for admission. He states that the patient is a danger to others at this time. Patient is a 25 year old Japanese man, lives with family but has been staying at a hotel for the last 2 weeks, unemployed, with documented past psychiatric diagnoses of mood disorder, ADHD, Oppositional defiant disorder, Disruptive behavior disorder, THC dependence, Bipolar disorder, MDD, Panic disorder, and psychosis with two hospitalizations from [facility] in Jan 2020 and [facility] in June 2020, no history of suicide attempts, presents to the hospital after increasingly strange behavior and threatening to kill father.     Pt seen in his room, lying down. When questions were asked, patient consistently replied "I don't know". He refused to answer questions about what brought him to the hospital and events leading up to it as well as his mood. He says he is not in a good place right now but does not wish to elaborate further. Pt agitated and irritable and acts paranoid and distrustful of interviewer and multiple times said "I don't want to talk to you". He then started to get agitated so the interview ended.   When patient re-approached and asked why he was brought to the hospital he said "my dad wouldn't suck my veronica". When asked why he would he said "to try" and "metaphorically". He kept repeating the same statement with a provocative smile so interview ended again.     As patient is a poor historian most information was received from father.      Please see  note for additional collateral information obtained by . As per collateral, patient is a 25 year old male, domiciled currently in a hotel for the last week or two following conflict in family home, with a diagnosis of Bipolar disorder, unemployed, BIBEMS activated by father for aggressive behavior and bizarre behavior that began Jan1. Father states he was prescribed Risperdal 3mg twice daily and only took 20 pills when he was discharged and has been non-adherent since. He states that the patient refuses to take medication and refuses to see a psychiatrist. Father states that he tried to convince the patient to go to the ED yesterday but the patient became verbally threatening. Today, father states that the patient made a statement threatening to kill his father after becoming angry. Father was scared and reports pt made gesture that he was going to punch him in the face. Father stopped police on the road and ems was called. Father states that the patient has been presenting worse where he has been paranoid against people and recently has been displaying a change in moods. Father denies prior violent/ aggressive behavior from pt in the past (before July 2020 admission). He states that the patient has been talking to himself and laughing to himself. He notes that he takes food to the patient every day and notices that the patient whispers to himself. Pt has history of self injurious behavior, and according to dad, yesterday cut his finger. He states that the patient has a history of smoking marijuana and smoking cigarettes but has not used anything as of lately. He denies that the patient has legal issues or medical problems. He denies that the patient has a SA. He states that the patient has not tested for covid-19 and has no covid-19 exposure. Father is advocating for admission. He states that the patient is a danger to others at this time.

## 2021-01-14 NOTE — ED PROVIDER NOTE - CLINICAL SUMMARY MEDICAL DECISION MAKING FREE TEXT BOX
Pt is a 26 yo male with pmhx of bipolar disorder, defiant oppositional disorder, MDD with recent admission in June 2020 BIBA for hallucinations and aggressive behavior at lunch with father today. Pt uncooperative with H&P. Will get psych consults and labs given history and apparent lack of compliance with medications.

## 2021-01-14 NOTE — ED BEHAVIORAL HEALTH ASSESSMENT NOTE - DESCRIPTION
none unemployed, domiciled alone in hotel agitated and irritable but redirectable. no Prns were given.   Vital Signs Last 24 Hrs  T(C): 36.7 (14 Jan 2021 12:06), Max: 36.7 (14 Jan 2021 12:06)  T(F): 98 (14 Jan 2021 12:06), Max: 98 (14 Jan 2021 12:06)  HR: 93 (14 Jan 2021 12:06) (93 - 93)  BP: 159/98 (14 Jan 2021 12:06) (159/98 - 159/98)  BP(mean): --  RR: 18 (14 Jan 2021 12:06) (18 - 18)  SpO2: 100% (14 Jan 2021 12:06) (100% - 100%)

## 2021-01-14 NOTE — ED ADULT NURSE REASSESSMENT NOTE - NS ED NURSE REASSESS COMMENT FT1
Pt cleared for emergency admission to [facility].  Pt is COVID negative and maintained behavioral control and remained safe.  Pt ate and hydrated.  Pt cooperative with transfer process.

## 2021-01-14 NOTE — ED ADULT NURSE NOTE - CHIEF COMPLAINT QUOTE
Patient brought to ER by EMS from [facility] after he was there was hallucinating, talking to himself and things that are not here. Non compliant with medications as per father. Yousef: [phone]

## 2021-01-14 NOTE — ED BEHAVIORAL HEALTH ASSESSMENT NOTE - OTHER
pt uncooperative. unable to assess. father currently staying at [facility] pt did not appear internally preoccupied [id number] currently staying at hotel, usually lives with family most likely experiencing paranoid delusions

## 2021-01-14 NOTE — ED BEHAVIORAL HEALTH ASSESSMENT NOTE - CASE SUMMARY
I have personally seen and examined this patient. I have fully participated in the care of this patient. I have made amendments to the documentation where appropriate and otherwise agree with the history, physical exam, and plan as documented by the Student.  Patient is a 25 year old Lao man, lives with family but has been staying at a hotel for the last 2 weeks, unemployed, with documented past psychiatric diagnoses of mood disorder, ADHD, Oppositional defiant disorder, Disruptive behavior disorder, THC dependence, Bipolar disorder, MDD, Panic disorder, and psychosis with two hospitalizations from [facility] in Jan 2020 and [facility] in June 2020, no history of suicide attempts, presents to the hospital after increasingly strange behavior and threatening to kill father. As per father's collateral information, patient's behavior is increasingly bizarre, demonstrating emotional lability, reporting paranoid delusions and was seen talking to self. Pt has hx of self injuring while psychotic. No hx of SA/HA at this time. According to father, he is non-adherent to medication or psychiatric follow-up and at this time given active symptoms of brooks and psychosis pt would benefit from rehospitalization on 9.39 status. Pt to be restarted on Risperdal.

## 2021-01-14 NOTE — ED BEHAVIORAL HEALTH NOTE - BEHAVIORAL HEALTH NOTE
COVID Exposure Screen- collateral (i.e. third-party, chart review, belongings, etc; include EMS and ED staff)     1.        *Has the patient had a COVID-19 test in the last 21 days?  (  ) Yes   (  x) No   (  ) Unknown- Reason: ______  IF YES PROCEED TO QUESTION #2. IF NO OR UNKNOWN THEN PLEASE SKIP TO QUESTION #3.  2.        Date of test: ________  3.        Do you know the result? (  ) Negative   (  ) Positive   (  ) No result available  4.        *In the past 14 days, has the patient been around anyone with a positive COVID-19 test?*  (  ) Yes   (x  ) No   (  ) Unknown- Reason (e.g. collateral uncertain, refusing to answer, etc.):  ______  IF YES PROCEED TO QUESTION #5. IF NO or UNKNOWN, PLEASE SKIP TO QUESTION #10  5.        Was the patient within 6 feet of them for at least 15 minutes? (  ) Yes   (  ) No   (  ) Unknown- Reason: ______   6.        Did the patient provide care for them? (  ) Yes   (  ) No   (  ) Unknown- Reason: ______   7.        Did the patient have direct physical contact with them (touched, hugged, or kissed them)? (  ) Yes   (  ) No    (  ) Unknown- Reason: ______   8.        Did the patient share eating or drinking utensils with them? (  ) Yes   (  ) No    (  ) Unknown- Reason: ______   9.        Have they sneezed, coughed, or somehow got respiratory droplets on the patient? (  ) Yes   (  ) No    (  ) Unknown- Reason: ______   10.     *Have you been out of [place] within the past 14 days?*  (  ) Yes   ( x ) No   (  ) Unknown- Reason (e.g. patient uncertain, sedated, refusing to answer, etc.): _______  IF YES PLEASE ANSWER THE FOLLOWING QUESTIONS:  11.     Which state/country have you been to? ______  12.     Were you there over 24 hours? (  ) Yes   (  ) No    (  ) Unknown- Reason: ______  13.     Date of return to [facility]: ______

## 2021-01-14 NOTE — ED PROVIDER NOTE - PHYSICAL EXAMINATION
PE:   GEN: Awake, alert, interactive, NAD  HEAD AND NECK: NC/AT. Airway patent. Neck supple.   EYES: Clear b/l. PERRL  CARDIAC: RRR. S1, S2. No evident pedal edema.    RESP: Normal respiratory effort with no use of accessory muscles or retractions. Clear throughout on auscultation.  ABD: soft, non-distended, non-tender. No rebound, no guarding.   NEURO: AOx3, CN II-XII grossly intact, no focal deficits.   MSK: Moving all extremities with no apparent deformities.   SKIN: Warm, dry, intact normal color   PSYCH: Preoccupied with father. Uncooperative

## 2021-01-15 LAB
A1C WITH ESTIMATED AVERAGE GLUCOSE RESULT: 4.8 % — SIGNIFICANT CHANGE UP (ref 4–5.6)
CHOLEST SERPL-MCNC: 133 MG/DL — SIGNIFICANT CHANGE UP
ESTIMATED AVERAGE GLUCOSE: 91 MG/DL — SIGNIFICANT CHANGE UP (ref 68–114)
HDLC SERPL-MCNC: 47 MG/DL — SIGNIFICANT CHANGE UP
LIPID PNL WITH DIRECT LDL SERPL: 74 MG/DL — SIGNIFICANT CHANGE UP
NON HDL CHOLESTEROL: 86 MG/DL — SIGNIFICANT CHANGE UP
TRIGL SERPL-MCNC: 59 MG/DL — SIGNIFICANT CHANGE UP

## 2021-01-15 PROCEDURE — 99222 1ST HOSP IP/OBS MODERATE 55: CPT

## 2021-01-15 RX ORDER — HALOPERIDOL DECANOATE 100 MG/ML
7.5 INJECTION INTRAMUSCULAR ONCE
Refills: 0 | Status: DISCONTINUED | OUTPATIENT
Start: 2021-01-15 | End: 2021-02-10

## 2021-01-15 RX ORDER — DIPHENHYDRAMINE HCL 50 MG
50 CAPSULE ORAL EVERY 4 HOURS
Refills: 0 | Status: DISCONTINUED | OUTPATIENT
Start: 2021-01-15 | End: 2021-02-10

## 2021-01-15 RX ORDER — DIPHENHYDRAMINE HCL 50 MG
50 CAPSULE ORAL ONCE
Refills: 0 | Status: DISCONTINUED | OUTPATIENT
Start: 2021-01-15 | End: 2021-02-10

## 2021-01-15 RX ORDER — HALOPERIDOL DECANOATE 100 MG/ML
7.5 INJECTION INTRAMUSCULAR EVERY 4 HOURS
Refills: 0 | Status: DISCONTINUED | OUTPATIENT
Start: 2021-01-15 | End: 2021-02-10

## 2021-01-15 RX ORDER — OLANZAPINE 15 MG/1
5 TABLET, FILM COATED ORAL AT BEDTIME
Refills: 0 | Status: COMPLETED | OUTPATIENT
Start: 2021-01-15 | End: 2021-01-15

## 2021-01-15 RX ORDER — OLANZAPINE 15 MG/1
10 TABLET, FILM COATED ORAL AT BEDTIME
Refills: 0 | Status: DISCONTINUED | OUTPATIENT
Start: 2021-01-16 | End: 2021-01-16

## 2021-01-15 RX ORDER — INFLUENZA VIRUS VACCINE 15; 15; 15; 15 UG/.5ML; UG/.5ML; UG/.5ML; UG/.5ML
0.5 SUSPENSION INTRAMUSCULAR ONCE
Refills: 0 | Status: DISCONTINUED | OUTPATIENT
Start: 2021-01-15 | End: 2021-02-10

## 2021-01-15 RX ADMIN — Medication 0.5 MILLIGRAM(S): at 09:34

## 2021-01-15 RX ADMIN — Medication 0.5 MILLIGRAM(S): at 21:09

## 2021-01-15 RX ADMIN — OLANZAPINE 5 MILLIGRAM(S): 15 TABLET, FILM COATED ORAL at 21:09

## 2021-01-15 NOTE — BH INPATIENT PSYCHIATRY ASSESSMENT NOTE - HPI (INCLUDE ILLNESS QUALITY, SEVERITY, DURATION, TIMING, CONTEXT, MODIFYING FACTORS, ASSOCIATED SIGNS AND SYMPTOMS)
Per ED note: "Patient is a 25 year old Ukrainian man, lives with family but has been staying at a hotel for the last 2 weeks, unemployed, with documented past psychiatric diagnoses of mood disorder, ADHD, Oppositional defiant disorder, Disruptive behavior disorder, THC dependence, Bipolar disorder, MDD, Panic disorder, and psychosis with two hospitalizations from [facility] in Jan 2020 and [facility] in June 2020, no history of suicide attempts, presents to the hospital after increasingly strange behavior and threatening to kill father.     Pt seen in his room, lying down. When questions were asked, patient consistently replied "I don't know". He refused to answer questions about what brought him to the hospital and events leading up to it as well as his mood. He says he is not in a good place right now but does not wish to elaborate further. Pt agitated and irritable and acts paranoid and distrustful of interviewer and multiple times said "I don't want to talk to you". He then started to get agitated so the interview ended.   When patient re-approached and asked why he was brought to the hospital he said "my dad wouldn't suck my veronica". When asked why he would he said "to try" and "metaphorically". He kept repeating the same statement with a provocative smile so interview ended again.     Per father, pt stopped taking medication shortly after discharge from [facility] last summer. He never followed up with a psychiatrist outpatient. Since his discharge, pt has been increasingly paranoid, argumentative with father. Pt laughing to himself, never sleeping, talking to self. Father says pt threatened to kill him, which is what prompted him to call 911 for this current admission.    As patient is a poor historian most information was received from father.      Please see  note for additional collateral information obtained by ."    On interview, pt says he came to the hospital because he has been getting into arguments with is father. Says that his father is "trying to turn me into his little girl...but i'm not his bitch." He describes his father as "a traditional man" who doesn't understand things about modern society. "I want to live my own life, but he's trying to control me." Pt says he got into an argument with his father - cannot describe details of argument - and his father called the police on him. Says that his whole family is trying to "turn on me," and they try to sabotage my life. Pt says he has been sleeping 10 hrs per night, good appetite. Describes his mood as "great", no changes in concentration or feelings of guilt. Denies AVH/IOR. Says that he has "urges" to hurt his father, but no intent or plan. Says that he "must defend myself" but not clear what he is defending or why.    Of note, when pt asked about cut son R forearm, pt says he was trying to make "artwork" on my arm "like a tattoo" but didn't want to get a tattoo because " I might change my mind some day." Pt denies trying to harm himself.

## 2021-01-15 NOTE — BH SOCIAL WORK INITIAL PSYCHOSOCIAL EVALUATION - NSBHSUPPORTOTHER_PSY_ALL_CORE
I will START or STAY ON the medications listed below when I get home from the hospital:    aspirin 81 mg oral tablet  -- 1 tab(s) by mouth once a day  -- Indication: For To prevent stent from closing     losartan-hydroCHLOROthiazide 100mg-12.5mg oral tablet  -- 1 tab(s) by mouth once a day  -- Indication: For Hypertension     clopidogrel 75 mg oral tablet  -- 1 tab(s) by mouth once a day  -- Indication: For To prevent stent from closing
No

## 2021-01-15 NOTE — BH INPATIENT PSYCHIATRY ASSESSMENT NOTE - NSBHASSESSSUMMFT_PSY_ALL_CORE
Patient is a 25 year old Sinhala man, lives with family but has been staying at a hotel for the last 2 weeks, unemployed, with documented past psychiatric diagnoses of mood disorder, ADHD, Oppositional defiant disorder, Disruptive behavior disorder, THC dependence, Bipolar disorder, MDD, Panic disorder, and psychosis with two hospitalizations from [facility] in Jan 2020 and [facility] in June 2020, no history of suicide attempts, +SIB, presents to the hospital after increasingly strange behavior and threatening to kill father.    On interview, pt is paranoid, endorsing persecutory delusions about his father trying to control his life. Pt easily agitated, abruptly ended interview when discussing his family. Pt with poor insight and elevated risk for violence. Mood is irritable, speech is not pressured but certainly overly productive. Per father, pt is hardly sleeping.  DDx includes schizophrenia vs schizoaffective vs bipolar type 1. Pt not willing to start Risperdal, so will start Zyprexa 5mg tonight with titration over the weekend. PRNs Ativan 3/Haldol 7.5/Benadryl 50 po/IM q4. Pt requires inpatient hospitalization for safety, stabilization and medication optimization    Plan  legal 9.27  psych: start zyprexa 5mg, titrate over weekend  prn: ativan 3/haldol 7.5/benadryl 50 po/IM q4h

## 2021-01-15 NOTE — BH INPATIENT PSYCHIATRY ASSESSMENT NOTE - NSBHCHARTREVIEWVS_PSY_A_CORE FT
Vital Signs Last 24 Hrs  T(C): 36.3 (15 Sumit 2021 06:53), Max: 37.1 (14 Jan 2021 20:59)  T(F): 97.4 (15 Sumit 2021 06:53), Max: 98.7 (14 Jan 2021 20:59)  HR: 78 (15 Sumit 2021 06:53) (78 - 93)  BP: 135/83 (15 Sumit 2021 06:53) (135/83 - 170/93)  BP(mean): --  RR: 18 (15 Sumit 2021 06:53) (16 - 19)  SpO2: 100% (14 Jan 2021 20:59) (99% - 100%)

## 2021-01-15 NOTE — BH INPATIENT PSYCHIATRY ASSESSMENT NOTE - DETAILS
none as per dad Per father, pts sister has possible dx of bipolar - however unclear if she is in treatment

## 2021-01-15 NOTE — BH SOCIAL WORK INITIAL PSYCHOSOCIAL EVALUATION - OTHER PAST PSYCHIATRIC HISTORY (INCLUDE DETAILS REGARDING ONSET, COURSE OF ILLNESS, INPATIENT/OUTPATIENT TREATMENT)
Per EMR pt has a hx of mood d/o, adhd, odd, disruptive d/o, bipolar MDD, psychosis,  had 2 prior inpt hospitalizations, last documented @ [facility] 6/20 , was linked outpt treatment subsequent to last discharge however family advised pt is non-compliant with tx, pt has no hx of SI/SA , some hx of AH/VH, can be disruptive and agitated when symptomatic, hx of being paranoid and mistrustful, has a hx of aggression and violent behavior, no legal issues, has no substance abuse hx, cannabis THC use, cigarettes

## 2021-01-15 NOTE — BH INPATIENT PSYCHIATRY ASSESSMENT NOTE - VIOLENCE RISK FACTORS:
Feeling of being under threat and being unable to control threat/Violent ideation/threat/speech/Impulsivity/Noncompliance with treatment

## 2021-01-15 NOTE — BH INPATIENT PSYCHIATRY ASSESSMENT NOTE - RISK ASSESSMENT
Pt is at moderate acute risk and elevated chronic risk for self harm. Risk factors include mood sxs/lability, impulsivity, hx of SIB, psychosis, medication noncompliance, social isolation, persecutory delusions. Protective factors include denying SI, supportive family, stable housing, culture beliefs.  Pt is at elevated risk for violence. Risk factors include his hx of aggression toward family members, mood lability, substance use, feels threatened/paranoid, poor insight, endorses "urges" for violence toward father. Protective factors include stable housing, supportive family, no access to firearms, no plan or intent.

## 2021-01-15 NOTE — BH INPATIENT PSYCHIATRY ASSESSMENT NOTE - CURRENT MEDICATION
MEDICATIONS  (STANDING):  benztropine 0.5 milliGRAM(s) Oral two times a day  influenza   Vaccine 0.5 milliLiter(s) IntraMuscular once  OLANZapine 5 milliGRAM(s) Oral at bedtime    MEDICATIONS  (PRN):  diphenhydrAMINE 50 milliGRAM(s) Oral every 4 hours PRN eps prophylaxis/agitation  diphenhydrAMINE   Injectable 50 milliGRAM(s) IntraMuscular once PRN eps ppx/agitation  haloperidol     Tablet 7.5 milliGRAM(s) Oral every 4 hours PRN psychotic agitation  haloperidol    Injectable 7.5 milliGRAM(s) IntraMuscular once PRN psychotic agitation  LORazepam     Tablet 3 milliGRAM(s) Oral every 4 hours PRN agitation/anxiety  LORazepam   Injectable 3 milliGRAM(s) IntraMuscular once PRN severe anxiety/agitation

## 2021-01-15 NOTE — BH INPATIENT PSYCHIATRY ASSESSMENT NOTE - NSSUICPROTFACT_PSY_ALL_CORE
Supportive social network of family or friends/Cultural, spiritual and/or moral attitudes against suicide

## 2021-01-15 NOTE — PSYCHIATRIC REHAB INITIAL EVALUATION - NSBHEDULEVEL_PSY_ALL_CORE
Patient shared that he did not receive a college education- but did not elaborate upon highest level of education./Other (Specify)

## 2021-01-15 NOTE — BH INPATIENT PSYCHIATRY ASSESSMENT NOTE - NSBHCHARTREVIEWLAB_PSY_A_CORE FT
16.5   11.31 )-----------( 124      ( 2021 12:55 )             49.8       14    141  |  103  |  12  ----------------------------<  94  4.7   |  26  |  0.80    Ca    9.7      2021 12:55    TPro  7.5  /  Alb  4.6  /  TBili  0.4  /  DBili  x   /  AST  19  /  ALT  13  /  AlkPhos  78                Urinalysis Basic - ( 2021 12:55 )    Color: Light Yellow / Appearance: Clear / S.021 / pH: x  Gluc: x / Ketone: Negative  / Bili: Negative / Urobili: <2 mg/dL   Blood: x / Protein: Trace / Nitrite: Negative   Leuk Esterase: Negative / RBC: x / WBC x   Sq Epi: x / Non Sq Epi: x / Bacteria: x                  CAPILLARY BLOOD GLUCOSE

## 2021-01-15 NOTE — BH INPATIENT PSYCHIATRY ASSESSMENT NOTE - CASE SUMMARY
25M w/pphx of prior psych admissions, last admitted to [facility] in 2020 for psychosis and self injurious behaviors, pt continues to endorse cutting as a form of art, is dysphoric about his father - gives vague and tangential account of their dynamic, becomes increasingly bizarre in interview with wide eyed staring at team members, becomes irate, claims there is incest in the family, then begins yelling at terminates interview.  Presents with both manic and psychotic symptoms.  Pt resistant to risperidone but open to Zyprexa.  Continue admission for safety and stabilization.
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
## 2021-01-15 NOTE — BH PATIENT PROFILE - NSVRISKLACKEMPATHY_PSY_ALL_CORE
Pre-Procedure Note/Attestation


Complete Prior to Procedure


Planned Procedure:  not applicable


Procedure Narrative:


small bowel resection





Indications for Procedure


Pre-Operative Diagnosis:


small bowel stricture, Garcia continent ileostomy





Attestation


I attest that I discussed the nature of the procedure; its benefits; risks and 

complications; and alternatives (and the risks and benefits of such alternatives

), prior to the procedure, with the patient (or the patient's legal 

representative).





I attest that, if there was a reasonable possibility of needing a blood 

transfusion, the patient (or the patient's legal representative) was given the 

[place] Department of Health Services standardized written summary, pursuant 

to the [place] Blood Safety Act ([place] Health and Safety Code # 1645, as 

amended).





I attest that I re-evaluated the patient just prior to the surgery and that 

there has been no change in the patient's H&P, except as documented below: none











Phil Tanner MD May 7, 2020 07:30 No

## 2021-01-15 NOTE — BH PATIENT PROFILE - VISION (WITH CORRECTIVE LENSES IF THE PATIENT USUALLY WEARS THEM):
Progress Note, Physician


Chief Complaint: 


Mr Jerome says he feels the same. He is still significantly short of breath. 

Denies cp or n/v.





- Current Medication List


Current Medications: 


Active Medications





Albuterol Sulfate (Ventolin 0.083% Nebulizer Soln -)  1 amp NEB Q6H PRN


   PRN Reason: SHORT OF BREATH/WHEEZING


   Last Admin: 05/28/17 22:30 Dose:  1 amp


Allopurinol (Zyloprim -)  100 mg PO DAILY [facility]


   Last Admin: 05/30/17 10:52 Dose:  100 mg


Ascorbic Acid (Vitamin C -)  1,000 mg PO DAILY [facility]


   Last Admin: 05/30/17 10:51 Dose:  1,000 mg


Aspirin (Asa -)  81 mg PO DAILY [facility]


   Last Admin: 05/30/17 10:51 Dose:  81 mg


Atorvastatin Calcium (Lipitor -)  40 mg PO HS [facility]


   Last Admin: 05/29/17 22:11 Dose:  40 mg


Bupropion HCl (Wellbutrin -)  75 mg PO DAILY [facility]


   Last Admin: 05/30/17 10:51 Dose:  75 mg


Carvedilol (Coreg -)  6.25 mg PO BID [facility]


   Last Admin: 05/30/17 10:51 Dose:  6.25 mg


Cholecalciferol (Vitamin D3 -)  1,000 unit PO DAILY [facility]


   Last Admin: 05/30/17 10:51 Dose:  1,000 unit


Clopidogrel Bisulfate (Plavix -)  75 mg PO DAILY [facility]


   Last Admin: 05/30/17 10:51 Dose:  75 mg


Docusate Sodium (Colace -)  100 mg PO BID [facility]


   Last Admin: 05/30/17 10:51 Dose:  100 mg


Ferrous Sulfate (Feosol -)  325 mg PO Q48H [facility]


   Last Admin: 05/28/17 16:25 Dose:  325 mg


Furosemide (Lasix Injection -)  80 mg IVPB BIDLASIX [facility]


   Last Admin: 05/30/17 10:50 Dose:  80 mg


Milrinone Lactate/Dextrose (Milrinone 20mg/100ml Ivpb -)  100 mls @ 10.049 mls/

hr IVPB TITR JARROD


   PRN Reason: 0.35 MCG/KG/MIN


   Last Admin: 05/30/17 10:49 Dose:  10.049 mls/hr


Insulin Aspart (Novolog Vial)  0 units SQ ACHS JARROD


   PRN Reason: Protocol


Multivitamins/Minerals/Vitamin C (Tab-A-Vit -)  1 tab PO DAILY [facility]


   Last Admin: 05/30/17 10:52 Dose:  1 tab


Ranitidine HCl (Zantac -)  75 mg PO DAILY [facility]


   Last Admin: 05/30/17 11:29 Dose:  Not Given


Warfarin Sodium (Coumadin -)  4 mg PO DAILY@1800 [facility]


   Last Admin: 05/29/17 17:39 Dose:  4 mg











- Objective


Vital Signs: 


 Vital Signs











Temperature  98 F   05/30/17 10:00


 


Pulse Rate  73   05/30/17 11:49


 


Respiratory Rate  20   05/30/17 10:00


 


Blood Pressure  102/60   05/30/17 10:00


 


O2 Sat by Pulse Oximetry (%)  92 L  05/30/17 11:49











Constitutional: Yes: Well Nourished, No Distress, Calm


Cardiovascular: Yes: Regular Rate and Rhythm.  No: Gallop, Murmur, Rub


Respiratory: Yes: Regular, On Nasal O2, Rales.  No: Rhonchi, Wheezes


Gastrointestinal: Yes: Normal Bowel Sounds, Soft.  No: Distention, Tenderness


Extremities: Yes: WNL


Edema: Yes


Edema: LLE: 2+, RLE: 2+


Labs: 


 CBC, BMP





 05/30/17 05:35 





 INR, PTT











INR  4.02  (0.82-1.09)  H* D 05/30/17  05:35    














Problem List





- Problems


(1) CHF (congestive heart failure)


Assessment/Plan: 


-end stage, on chronic milrinone


-case d/w cardiology


-placed on IV lasix for diuresis


-unable to tolerate entresto or aldactone secondary to hypotension





Code(s): I50.9 - HEART FAILURE, UNSPECIFIED   Qualifiers: 


     Congestive heart failure type: combined     Congestive heart failure 

chronicity: acute on chronic        Qualified Code(s): I50.43 - Acute on 

chronic combined systolic (congestive) and diastolic (congestive) heart failure

  





(2) Acute and chronic respiratory failure (acute-on-chronic)


Assessment/Plan: 


-Dr Kern notes patient is more somnolent than usual


-? hypercapnea 


-will obtain ABG and oxygen saturation


-if hypercapneic, will place on bipap and consult pulmonary


Code(s): J96.20 - ACUTE AND CHR RESP FAILURE, UNSP W HYPOXIA OR HYPERCAPNIA   

Qualifiers: 


     Respiratory failure complication: hypoxia        Qualified Code(s): J96.21 

- Acute and chronic respiratory failure with hypoxia  





(3) CAD (coronary artery disease)


Assessment/Plan: 


-at baseline


-no chest pain


-cardiology following


-continue current management


Code(s): I25.10 - ATHSCL HEART DISEASE OF NATIVE CORONARY ARTERY W/O ANG PCTRS 

  





(4) Diabetes


Assessment/Plan: 


-diabetic diet


-FSBS and SSI


Code(s): E11.9 - TYPE 2 DIABETES MELLITUS WITHOUT COMPLICATIONS   Qualifiers: 


     Diabetes mellitus type: type 2     Diabetes mellitus complication status: 

with kidney complications     Diabetes mellitus complication detail: with 

chronic kidney disease     Chronic kidney disease stage: stage 4 (severe)      

  Qualified Code(s):  -   





(5) AICD (automatic cardioverter/defibrillator) present


Assessment/Plan: 


-on chronic coumadin


-hold, INR elevated


Code(s): Z95.810 - PRESENCE OF AUTOMATIC (IMPLANTABLE) CARDIAC DEFIBRILLATOR





(6) CKD (chronic kidney disease)


Assessment/Plan: 


-improving with diuresis


-aspect of cardiorenal syndrome


-continue to monitor


Code(s): N18.9 - CHRONIC KIDNEY DISEASE, UNSPECIFIED   Qualifiers: 


     Chronic kidney disease stage: stage 4 (severe)        Qualified Code(s): 

N18.4 - Chronic kidney disease, stage 4 (severe)  





(7) COPD (chronic obstructive pulmonary disease)


Assessment/Plan: 


-does not appear in exacerbation


-however will check ABG, may be hypercapneic


-if hypercapneic, suspect combination of COPD and CHF exacerbation


-continue current management


-consider pulmonary consult pending ABG


Code(s): J44.9 - CHRONIC OBSTRUCTIVE PULMONARY DISEASE, UNSPECIFIED           

Qualified Code(s):  - Normal vision: sees adequately in most situations; can see medication labels, newsprint

## 2021-01-15 NOTE — BH INPATIENT PSYCHIATRY ASSESSMENT NOTE - OTHER PAST PSYCHIATRIC HISTORY (INCLUDE DETAILS REGARDING ONSET, COURSE OF ILLNESS, INPATIENT/OUTPATIENT TREATMENT)
Pt was hospitalized twice. most recently at [facility] in 06/2020.   not currently adherent or following up with treatment

## 2021-01-15 NOTE — PSYCHIATRIC REHAB INITIAL EVALUATION - NSBHPRRECOMMEND_PSY_ALL_CORE
Patient was initially agreeable to meet with writer for initial interview and orientation to unit.  During meeting patient presented as largely labile.  Patient responded to writers inquiries, however due to irritability, interview was ultimately terminated prematurely.  Patient shared that he was in the hospital because his parents said that he "needed to be here".  Patient made it clear that he was not in agreement with his parents assessment that he require hospitalization.  Patient expressed anger towards his parents and siblings, calling them "heartless".  Patient was unwilling to elaborate upon symptomology and events precipitating admission citing "he already shared these details "with the doctor".  Patients speech was loud, and patient would yell at times.  Patients eye contact vacillated between intense and avoidant.  Patient was dressed in hospital gown and personal clothing.  Patients ADL's were fair.  Given patient's lability an appropriate/relevant rehabilitative goal will be selected for patient.

## 2021-01-16 PROCEDURE — 99232 SBSQ HOSP IP/OBS MODERATE 35: CPT

## 2021-01-16 RX ORDER — OLANZAPINE 15 MG/1
15 TABLET, FILM COATED ORAL AT BEDTIME
Refills: 0 | Status: DISCONTINUED | OUTPATIENT
Start: 2021-01-16 | End: 2021-01-18

## 2021-01-16 RX ADMIN — Medication 0.5 MILLIGRAM(S): at 08:36

## 2021-01-16 RX ADMIN — Medication 3 MILLIGRAM(S): at 20:50

## 2021-01-16 RX ADMIN — Medication 0.5 MILLIGRAM(S): at 20:50

## 2021-01-16 RX ADMIN — OLANZAPINE 15 MILLIGRAM(S): 15 TABLET, FILM COATED ORAL at 20:50

## 2021-01-16 RX ADMIN — Medication 50 MILLIGRAM(S): at 20:50

## 2021-01-16 NOTE — BH INPATIENT PSYCHIATRY PROGRESS NOTE - NSBHFUPINTERVALCCFT_PSY_A_CORE
Patient seen for follow up of psychosis with paranoia and threatening behaviors.  Case reviewed with comprehensive treatment team.  Patient remains irritable, labile, seen exercising relentlessly on unit, refusing to cooperate with psychiatric interview.  States "I'm not talking to anyone now!"  Intense stare, yelling loudly with self-talk.    Patient is compliant with medications with no reported or observed side effects.  Patient is eating and sleeping well.

## 2021-01-17 LAB
SARS-COV-2 IGG SERPL QL IA: NEGATIVE — SIGNIFICANT CHANGE UP
SARS-COV-2 IGM SERPL IA-ACNC: <0.1 INDEX — SIGNIFICANT CHANGE UP

## 2021-01-17 PROCEDURE — 99232 SBSQ HOSP IP/OBS MODERATE 35: CPT

## 2021-01-17 RX ORDER — CLONAZEPAM 1 MG
1 TABLET ORAL AT BEDTIME
Refills: 0 | Status: DISCONTINUED | OUTPATIENT
Start: 2021-01-17 | End: 2021-01-19

## 2021-01-17 RX ADMIN — OLANZAPINE 15 MILLIGRAM(S): 15 TABLET, FILM COATED ORAL at 20:28

## 2021-01-17 RX ADMIN — Medication 1 MILLIGRAM(S): at 20:28

## 2021-01-17 RX ADMIN — Medication 50 MILLIGRAM(S): at 16:37

## 2021-01-17 RX ADMIN — Medication 0.5 MILLIGRAM(S): at 08:23

## 2021-01-18 PROCEDURE — 99232 SBSQ HOSP IP/OBS MODERATE 35: CPT

## 2021-01-18 RX ORDER — BACITRACIN ZINC 500 UNIT/G
1 OINTMENT IN PACKET (EA) TOPICAL
Refills: 0 | Status: DISCONTINUED | OUTPATIENT
Start: 2021-01-18 | End: 2021-02-10

## 2021-01-18 RX ORDER — OLANZAPINE 15 MG/1
20 TABLET, FILM COATED ORAL AT BEDTIME
Refills: 0 | Status: DISCONTINUED | OUTPATIENT
Start: 2021-01-18 | End: 2021-01-26

## 2021-01-18 RX ADMIN — OLANZAPINE 20 MILLIGRAM(S): 15 TABLET, FILM COATED ORAL at 21:43

## 2021-01-18 RX ADMIN — Medication 1 MILLIGRAM(S): at 21:43

## 2021-01-18 RX ADMIN — Medication 0.5 MILLIGRAM(S): at 21:43

## 2021-01-19 PROCEDURE — 99232 SBSQ HOSP IP/OBS MODERATE 35: CPT

## 2021-01-19 RX ORDER — BENZOCAINE AND MENTHOL 5; 1 G/100ML; G/100ML
1 LIQUID ORAL
Refills: 0 | Status: DISCONTINUED | OUTPATIENT
Start: 2021-01-19 | End: 2021-02-10

## 2021-01-19 RX ORDER — CLONAZEPAM 1 MG
1 TABLET ORAL AT BEDTIME
Refills: 0 | Status: DISCONTINUED | OUTPATIENT
Start: 2021-01-19 | End: 2021-01-26

## 2021-01-19 RX ADMIN — OLANZAPINE 20 MILLIGRAM(S): 15 TABLET, FILM COATED ORAL at 20:49

## 2021-01-19 RX ADMIN — Medication 0.5 MILLIGRAM(S): at 20:49

## 2021-01-19 RX ADMIN — BENZOCAINE AND MENTHOL 1 LOZENGE: 5; 1 LIQUID ORAL at 05:09

## 2021-01-19 RX ADMIN — Medication 1 APPLICATION(S): at 08:17

## 2021-01-19 RX ADMIN — Medication 1 MILLIGRAM(S): at 20:49

## 2021-01-19 RX ADMIN — Medication 0.5 MILLIGRAM(S): at 08:28

## 2021-01-19 NOTE — BH INPATIENT PSYCHIATRY PROGRESS NOTE - NSBHFUPINTERVALCCFT_PSY_A_CORE
: Patient seen for follow up of psychosis with paranoia and threatening behaviors.  Case discussed with comprehensive treatment team. Pt slept approx. 8 hrs last night, no PRNs.

## 2021-01-19 NOTE — BH INPATIENT PSYCHIATRY PROGRESS NOTE - NSBHASSESSSUMMFT_PSY_ALL_CORE
Pt is 27 yo M hx of bipolar d/o vs schizophrenia vs schizoaffective presenting to [facility] w/ worsening agitation, getting into arguments with family and father, not sleeping, irritable. Pt remains irritable, especially when discussing his father. He is oddly-related with intense stare, aggressive demeanor – ASPD traits. Will c/w Zyprexa 20mg and klonopin 1mg at night. Pt requires inpatient hospitalization for safety, stabilization and medication optimization.   Plan  - Legals: 9.27  - Obs: routine  - Meds: Zyprexa 20mg, klonopin 1mg qhs

## 2021-01-20 PROCEDURE — 99232 SBSQ HOSP IP/OBS MODERATE 35: CPT

## 2021-01-20 RX ORDER — LITHIUM CARBONATE 300 MG/1
600 TABLET, EXTENDED RELEASE ORAL AT BEDTIME
Refills: 0 | Status: DISCONTINUED | OUTPATIENT
Start: 2021-01-20 | End: 2021-01-26

## 2021-01-20 RX ADMIN — Medication 3 MILLIGRAM(S): at 15:25

## 2021-01-20 RX ADMIN — HALOPERIDOL DECANOATE 7.5 MILLIGRAM(S): 100 INJECTION INTRAMUSCULAR at 15:24

## 2021-01-20 RX ADMIN — Medication 50 MILLIGRAM(S): at 15:25

## 2021-01-20 RX ADMIN — BENZOCAINE AND MENTHOL 1 LOZENGE: 5; 1 LIQUID ORAL at 04:59

## 2021-01-20 RX ADMIN — Medication 1 APPLICATION(S): at 08:18

## 2021-01-20 NOTE — BH INPATIENT PSYCHIATRY PROGRESS NOTE - NSBHFUPINTERVALCCFT_PSY_A_CORE
Patient seen for follow up of psychosis with paranoia and threatening behaviors.  Case discussed with comprehensive treatment team. Pt slept approx. 12-5am.

## 2021-01-20 NOTE — BH INPATIENT PSYCHIATRY PROGRESS NOTE - NSBHASSESSSUMMFT_PSY_ALL_CORE
Pt is 27 yo M hx of bipolar d/o vs schizophrenia vs schizoaffective presenting to [facility] w/ worsening agitation, getting into arguments with family and father, not sleeping, irritable. Pt remains irritable, especially when discussing his father. He is oddly-related with intense stare, aggressive demeanor – ASPD traits. Will c/w Zyprexa 20mg and klonopin 1mg at night. Start lithium titration for mood. Pt requires inpatient hospitalization for safety, stabilization and medication optimization.   Plan  - Legals: 9.27  - Obs: routine  - Meds: Zyprexa 20mg, klonopin 1mg qhs, lithium 600mg qhs

## 2021-01-21 PROCEDURE — 99232 SBSQ HOSP IP/OBS MODERATE 35: CPT

## 2021-01-21 RX ORDER — LIDOCAINE 4 G/100G
30 CREAM TOPICAL ONCE
Refills: 0 | Status: COMPLETED | OUTPATIENT
Start: 2021-01-21 | End: 2021-01-28

## 2021-01-21 RX ORDER — ACETAMINOPHEN 500 MG
650 TABLET ORAL ONCE
Refills: 0 | Status: COMPLETED | OUTPATIENT
Start: 2021-01-21 | End: 2021-01-21

## 2021-01-21 RX ADMIN — OLANZAPINE 20 MILLIGRAM(S): 15 TABLET, FILM COATED ORAL at 20:53

## 2021-01-21 RX ADMIN — LITHIUM CARBONATE 600 MILLIGRAM(S): 300 TABLET, EXTENDED RELEASE ORAL at 20:52

## 2021-01-21 RX ADMIN — Medication 650 MILLIGRAM(S): at 01:47

## 2021-01-21 RX ADMIN — Medication 1 MILLIGRAM(S): at 20:52

## 2021-01-21 NOTE — BH INPATIENT PSYCHIATRY PROGRESS NOTE - NSBHASSESSSUMMFT_PSY_ALL_CORE
Pt is 25 yo M hx of bipolar d/o vs schizophrenia vs schizoaffective presenting to [facility] w/ worsening agitation, getting into arguments with family and father, not sleeping, irritable. Pt remains irritable, especially when discussing his father. He is oddly-related with intense stare, aggressive demeanor – ASPD traits. Will c/w Zyprexa 20mg and klonopin 1mg at night, c/w lithium. Pt requires inpatient hospitalization for safety, stabilization and medication optimization. Patient remains tenuous and unable to attend/ tolerate groups.   Plan  - Legals: 9.27  - Obs: routine  - Meds: Zyprexa 20mg, klonopin 1mg qhs, lithium 600mg qhs--adherence encouraged.

## 2021-01-21 NOTE — BH INPATIENT PSYCHIATRY PROGRESS NOTE - NSBHFUPINTERVALCCFT_PSY_A_CORE
Patient seen for follow up of psychosis with paranoia and threatening behaviors.  Case discussed with comprehensive treatment team. Pt required PRN 3/7.5/50 po last night for agitation

## 2021-01-22 PROCEDURE — 99232 SBSQ HOSP IP/OBS MODERATE 35: CPT

## 2021-01-22 RX ADMIN — LITHIUM CARBONATE 600 MILLIGRAM(S): 300 TABLET, EXTENDED RELEASE ORAL at 21:02

## 2021-01-22 RX ADMIN — Medication 3 MILLIGRAM(S): at 04:18

## 2021-01-22 RX ADMIN — Medication 1 MILLIGRAM(S): at 21:02

## 2021-01-22 RX ADMIN — OLANZAPINE 20 MILLIGRAM(S): 15 TABLET, FILM COATED ORAL at 21:02

## 2021-01-22 RX ADMIN — Medication 1 APPLICATION(S): at 22:14

## 2021-01-22 NOTE — BH INPATIENT PSYCHIATRY PROGRESS NOTE - NSBHASSESSSUMMFT_PSY_ALL_CORE
: Pt is 25 yo M hx of bipolar d/o vs schizophrenia vs schizoaffective presenting to [facility] w/ worsening agitation, getting into arguments with family and father, not sleeping, irritable. Pt remains irritable, especially when discussing his father. He is oddly-related with intense stare, aggressive demeanor – ASPD traits. Will c/w Zyprexa 20mg and klonopin 1mg at night, c/w lithium. Pt requires inpatient hospitalization for safety, stabilization and medication optimization.   Plan  - Legals: 9.27  - Obs: routine  - Meds: Zyprexa 20mg, klonopin 1mg qhs, lithium 600mg qhs

## 2021-01-23 RX ADMIN — OLANZAPINE 20 MILLIGRAM(S): 15 TABLET, FILM COATED ORAL at 20:54

## 2021-01-23 RX ADMIN — LITHIUM CARBONATE 600 MILLIGRAM(S): 300 TABLET, EXTENDED RELEASE ORAL at 20:54

## 2021-01-23 RX ADMIN — HALOPERIDOL DECANOATE 7.5 MILLIGRAM(S): 100 INJECTION INTRAMUSCULAR at 10:17

## 2021-01-23 RX ADMIN — Medication 1 APPLICATION(S): at 21:11

## 2021-01-23 RX ADMIN — Medication 3 MILLIGRAM(S): at 10:16

## 2021-01-23 RX ADMIN — Medication 1 MILLIGRAM(S): at 20:38

## 2021-01-23 RX ADMIN — Medication 50 MILLIGRAM(S): at 10:16

## 2021-01-23 RX ADMIN — Medication 3 MILLIGRAM(S): at 02:08

## 2021-01-24 RX ADMIN — OLANZAPINE 20 MILLIGRAM(S): 15 TABLET, FILM COATED ORAL at 21:21

## 2021-01-24 RX ADMIN — LITHIUM CARBONATE 600 MILLIGRAM(S): 300 TABLET, EXTENDED RELEASE ORAL at 21:21

## 2021-01-24 RX ADMIN — Medication 3 MILLIGRAM(S): at 11:08

## 2021-01-25 PROCEDURE — 99231 SBSQ HOSP IP/OBS SF/LOW 25: CPT

## 2021-01-25 RX ADMIN — OLANZAPINE 20 MILLIGRAM(S): 15 TABLET, FILM COATED ORAL at 20:42

## 2021-01-25 RX ADMIN — LITHIUM CARBONATE 600 MILLIGRAM(S): 300 TABLET, EXTENDED RELEASE ORAL at 20:42

## 2021-01-25 NOTE — BH INPATIENT PSYCHIATRY PROGRESS NOTE - NSBHFUPINTERVALCCFT_PSY_A_CORE
Patient seen for follow up of psychosis with paranoia and threatening behaviors.  Case discussed with comprehensive treatment team. Pt required PRN ativan last night for agitation, 3/7.5/50 on Saturday.

## 2021-01-25 NOTE — BH INPATIENT PSYCHIATRY PROGRESS NOTE - NSBHASSESSSUMMFT_PSY_ALL_CORE
: Pt is 27 yo M hx of bipolar d/o vs schizophrenia vs schizoaffective presenting to [facility] w/ worsening agitation, getting into arguments with family and father, not sleeping, irritable. Pt remains irritable, especially when discussing his father. He is oddly-related with intense stare, aggressive demeanor – ASPD traits. Requiring intermittent prns for agitation towards peers/staff. Will c/w Zyprexa 20mg and klonopin 1mg at night, c/w lithium. Pt requires inpatient hospitalization for safety, stabilization and medication optimization.    Pt refusing lithium level check due to paranoia/delusions. Will continue to encourage bloodwork, will keep dose stable pending lithium level.     Plan  - Legals: 9.27  - Obs: routine,  no CO required ,pt denies SI/HI  - Psych: Zyprexa 20mg, klonopin 1mg qhs, lithium 600mg qhs. PRNS for agitation  - Medical: NTD

## 2021-01-25 NOTE — BH INPATIENT PSYCHIATRY PROGRESS NOTE - CASE SUMMARY
Pt is 27 yo M hx of psychotic illness and likely schizoaffective disorder presenting to [facility] w/ worsening agitation, getting into arguments with family and father. Pt remains irritable, especially when discussing his father. He has been requiring intermittent prns for agitation towards peers/staff.   Patient remains acutely ill with symptoms as noted above and requires acute inpatient care for acute treatment of psychosis.   Patient does not require constant observation at this time and will follow with routine checks. Patient ended interview abruptly today and refused blood work. Will continue with treatment plan as described above of olanzapine 20 mg and lithium 600 mg and will follow for response.

## 2021-01-26 PROCEDURE — 99231 SBSQ HOSP IP/OBS SF/LOW 25: CPT

## 2021-01-26 RX ORDER — OLANZAPINE 15 MG/1
25 TABLET, FILM COATED ORAL AT BEDTIME
Refills: 0 | Status: DISCONTINUED | OUTPATIENT
Start: 2021-01-26 | End: 2021-01-27

## 2021-01-26 RX ORDER — DIPHENHYDRAMINE HCL 50 MG
50 CAPSULE ORAL ONCE
Refills: 0 | Status: COMPLETED | OUTPATIENT
Start: 2021-01-26 | End: 2021-01-26

## 2021-01-26 RX ORDER — DIVALPROEX SODIUM 500 MG/1
1500 TABLET, DELAYED RELEASE ORAL AT BEDTIME
Refills: 0 | Status: DISCONTINUED | OUTPATIENT
Start: 2021-01-28 | End: 2021-02-10

## 2021-01-26 RX ORDER — SODIUM CHLORIDE 0.65 %
1 AEROSOL, SPRAY (ML) NASAL DAILY
Refills: 0 | Status: DISCONTINUED | OUTPATIENT
Start: 2021-01-26 | End: 2021-02-10

## 2021-01-26 RX ORDER — DIVALPROEX SODIUM 500 MG/1
750 TABLET, DELAYED RELEASE ORAL AT BEDTIME
Refills: 0 | Status: COMPLETED | OUTPATIENT
Start: 2021-01-26 | End: 2021-01-27

## 2021-01-26 RX ORDER — HALOPERIDOL DECANOATE 100 MG/ML
7.5 INJECTION INTRAMUSCULAR ONCE
Refills: 0 | Status: COMPLETED | OUTPATIENT
Start: 2021-01-26 | End: 2021-01-26

## 2021-01-26 RX ADMIN — Medication 1 APPLICATION(S): at 20:34

## 2021-01-26 RX ADMIN — HALOPERIDOL DECANOATE 7.5 MILLIGRAM(S): 100 INJECTION INTRAMUSCULAR at 05:25

## 2021-01-26 RX ADMIN — Medication 50 MILLIGRAM(S): at 05:25

## 2021-01-26 RX ADMIN — DIVALPROEX SODIUM 750 MILLIGRAM(S): 500 TABLET, DELAYED RELEASE ORAL at 20:30

## 2021-01-26 RX ADMIN — Medication 3 MILLIGRAM(S): at 05:26

## 2021-01-26 RX ADMIN — OLANZAPINE 25 MILLIGRAM(S): 15 TABLET, FILM COATED ORAL at 20:29

## 2021-01-26 NOTE — BH INPATIENT PSYCHIATRY PROGRESS NOTE - CASE SUMMARY
Pt is 27 yo M hx of psychotic illness and likely schizoaffective disorder presenting to [facility] w/ worsening agitation, getting into arguments with family and father. Pt remains irritable, especially when discussing his father. He has been requiring intermittent prns for agitation towards peers/staff.   Patient remains acutely ill with symptoms as noted above and requires acute inpatient care for acute treatment of psychosis.   Patient does not require constant observation at this time and will follow with routine checks. Patient ended interview abruptly today and refused blood work. Will continue with treatment plan as described above of olanzapine 20 mg and lithium 600 mg and will follow for response.   Pt is 27 yo M hx of psychotic illness and likely schizoaffective disorder presenting to [facility] w/ worsening agitation, getting into arguments with family and father. Pt remains irritable, especially when discussing his father. He has been requiring intermittent PRN's for agitation towards peers/staff.   Patient remains acutely ill with symptoms as noted above and requires acute inpatient care for acute treatment of psychosis.   Patient does not require constant observation at this time and will follow with routine checks. Patient ended interview abruptly today and refused blood work. Will continue with treatment plan as described above and will plan to increase the olanzapine to 25 mg and as patient refusing blood work will plan to discontinue lithium and instead replace it with depakote and will follow for response.

## 2021-01-26 NOTE — BH INPATIENT PSYCHIATRY PROGRESS NOTE - NSBHASSESSSUMMFT_PSY_ALL_CORE
: Pt is 25 yo M hx of bipolar d/o vs schizophrenia vs schizoaffective presenting to [facility] w/ worsening agitation, getting into arguments with family and father, not sleeping, irritable. Pt remains irritable, especially when discussing his father. He is oddly-related with intense stare, aggressive demeanor – ASPD traits. Requiring intermittent prns for agitation towards peers/staff. Will increase Zyprexa 25mg, d/c klonopin, d/c lithium and start Depakote ER for mood and sedation. Pt requires inpatient hospitalization for safety, stabilization and medication optimization.       Plan  - Legals: 9.27  - Obs: routine,  no CO required ,pt denies SI/HI  - Psych: Zyprexa 25mg, Depakote ER 750mg qhs. PRNS for agitation  - Medical: NTD   : Pt is 25 yo M hx of bipolar d/o vs schizophrenia vs schizoaffective presenting to [facility] w/ worsening agitation, getting into arguments with family and father, not sleeping, irritable. Pt remains irritable, especially when discussing his father. He is oddly-related with intense stare, aggressive demeanor – ASPD traits. Requiring intermittent prns for agitation towards peers/staff. Will increase Zyprexa 25mg, d/c klonopin, d/c lithium and start Depakote ER for mood and sedation. Pt requires inpatient hospitalization for safety, stabilization and medication optimization.       Plan  - Legals: 9.27  - Obs: routine,  no CO required ,pt denies SI/HI  - Psych: Zyprexa 25mg, and start Depakote ER  at 750mg qhs. PRNS for agitation  - Medical: NTD

## 2021-01-26 NOTE — BH CHART NOTE - NSHIGHRISKBEHFT_PSY_ALL_CORE
LEEANN called for activation of IM medication due to patient agitation. Psych emergency called. Per staff patient was agitated, unable to be redirected, jumping onto the counter and cursing at staff. Haldol 7.5 mg IM x 1, Ativan 3 mg IM x 1, Benadryl 50 mg IM x 1 activated for agitation and threat to others. Patient seen after IM administered, noted to be resting comfortably in NAD, IM with fair effect. Advised RN staff to notify LEEANN if patient continues to be agitated.

## 2021-01-27 PROCEDURE — 99232 SBSQ HOSP IP/OBS MODERATE 35: CPT

## 2021-01-27 RX ORDER — LANOLIN ALCOHOL/MO/W.PET/CERES
5 CREAM (GRAM) TOPICAL AT BEDTIME
Refills: 0 | Status: DISCONTINUED | OUTPATIENT
Start: 2021-01-27 | End: 2021-02-10

## 2021-01-27 RX ORDER — OLANZAPINE 15 MG/1
30 TABLET, FILM COATED ORAL AT BEDTIME
Refills: 0 | Status: DISCONTINUED | OUTPATIENT
Start: 2021-01-27 | End: 2021-02-10

## 2021-01-27 RX ORDER — ACETAMINOPHEN 500 MG
650 TABLET ORAL ONCE
Refills: 0 | Status: DISCONTINUED | OUTPATIENT
Start: 2021-01-27 | End: 2021-02-08

## 2021-01-27 RX ADMIN — DIVALPROEX SODIUM 750 MILLIGRAM(S): 500 TABLET, DELAYED RELEASE ORAL at 21:09

## 2021-01-27 RX ADMIN — Medication 5 MILLIGRAM(S): at 22:02

## 2021-01-27 RX ADMIN — OLANZAPINE 30 MILLIGRAM(S): 15 TABLET, FILM COATED ORAL at 21:10

## 2021-01-27 RX ADMIN — Medication 1 APPLICATION(S): at 08:05

## 2021-01-27 NOTE — BH INPATIENT PSYCHIATRY PROGRESS NOTE - NSBHASSESSSUMMFT_PSY_ALL_CORE
Pt is 27 yo M hx of bipolar d/o vs schizophrenia vs schizoaffective presenting to [facility] w/ worsening agitation, getting into arguments with family and father, not sleeping, irritable. Pt remains irritable, especially when discussing his father. He is oddly-related with intense stare, aggressive demeanor – ASPD traits. Requiring intermittent prns for agitation towards peers/staff. C/w Zyprexa 25mg, c/w  Depakote ER for mood and sedation. Pt requires inpatient hospitalization for safety, stabilization and medication optimization.       Plan  - Legals: 9.27  - Obs: routine,  no CO required ,pt denies SI/HI  - Psych: Zyprexa 25mg, and start Depakote ER  at 750mg qhs. PRNS for agitation  - Medical: NTD

## 2021-01-27 NOTE — BH INPATIENT PSYCHIATRY PROGRESS NOTE - CASE SUMMARY
Pt is 27 yo M hx of psychotic illness and likely schizoaffective disorder presenting to [facility] w/ worsening agitation, getting into arguments with family and father. Pt remains irritable, especially when discussing his father. He has been requiring intermittent PRN's for agitation towards peers/staff.   Patient remains acutely ill with symptoms as noted above and requires acute inpatient care for acute treatment of psychosis.   Patient does not require constant observation at this time and will follow with routine checks. Patient ended interview abruptly today and refused blood work. Will continue with treatment plan as described above and will plan to increase the olanzapine to 25 mg and as patient refusing blood work will plan to discontinue lithium and instead replace it with depakote and will follow for response.   Pt is 27 yo M hx of psychotic illness and likely schizoaffective disorder presenting to [facility] w/ worsening agitation, getting into arguments with family and father. Pt remains irritable, especially when discussing his father. He has been requiring intermittent PRN's for agitation towards peers/staff.   Patient remains acutely ill with symptoms as noted above and requires acute inpatient care for acute treatment of psychosis.   Patient does not require constant observation at this time and will follow with routine checks. Patient ended interview abruptly today and refused blood work. Will continue with treatment plan as described above and will plan to increase the olanzapine to 30 mg and contin with depakote and will follow for response.

## 2021-01-27 NOTE — BH INPATIENT PSYCHIATRY PROGRESS NOTE - MODIFICATIONS
Patient interviewed and evaluated with resident present to follow up on symptoms and the case has been reviewed with the treatment team this morning.   I was physically present during the service to the patient and personally examined the patient and was directly involved in the management and recommendations of the care provided to the patient.  I have reviewed the resident's progress note and the mental status examination and I agree with its contents and made changes as indicated Patient interviewed and evaluated to follow up on symptoms and the case has been reviewed with the treatment team this morning.   I was physically present during the service to the patient and personally examined the patient and was directly involved in the management and recommendations of the care provided to the patient.  I have reviewed the resident's progress note and the mental status examination and I agree with its contents and made changes as indicated

## 2021-01-28 PROCEDURE — 99232 SBSQ HOSP IP/OBS MODERATE 35: CPT

## 2021-01-28 RX ADMIN — Medication 5 MILLIGRAM(S): at 21:15

## 2021-01-28 RX ADMIN — Medication 1 APPLICATION(S): at 09:04

## 2021-01-28 RX ADMIN — Medication 3 MILLIGRAM(S): at 04:41

## 2021-01-28 RX ADMIN — LIDOCAINE 30 MILLILITER(S): 4 CREAM TOPICAL at 21:53

## 2021-01-28 RX ADMIN — OLANZAPINE 30 MILLIGRAM(S): 15 TABLET, FILM COATED ORAL at 21:15

## 2021-01-28 RX ADMIN — DIVALPROEX SODIUM 1500 MILLIGRAM(S): 500 TABLET, DELAYED RELEASE ORAL at 21:15

## 2021-01-28 NOTE — BH INPATIENT PSYCHIATRY PROGRESS NOTE - NSBHASSESSSUMMFT_PSY_ALL_CORE
Pt is 27 yo M hx of bipolar d/o vs schizophrenia vs schizoaffective presenting to [facility] w/ worsening agitation, getting into arguments with family and father, not sleeping, irritable. Pt remains irritable, especially when discussing his father. He is oddly-related with intense stare, aggressive demeanor – ASPD traits. Requiring intermittent prns for agitation towards peers/staff. C/w Zyprexa 30mg, c/w  Depakote ER for mood and sedation. Pt requires inpatient hospitalization for safety, stabilization and medication optimization.       Plan  - Legals: 9.27  - Obs: routine,  no CO required ,pt denies SI/HI  - Psych: Zyprexa 30mg, and start Depakote ER  at 1500qhs. PRNS for agitation  - Medical: NTD

## 2021-01-28 NOTE — BH INPATIENT PSYCHIATRY PROGRESS NOTE - CASE SUMMARY
Pt is 27 yo M hx of psychotic illness and likely schizoaffective disorder presenting to [facility] w/ worsening agitation, getting into arguments with family and father. Pt remains irritable, especially when discussing his father. He has been requiring intermittent PRN's for agitation towards peers/staff.   Patient remains acutely ill with symptoms as noted above and requires acute inpatient care for acute treatment of psychosis.   Patient does not require constant observation at this time and will follow with routine checks. Patient ended interview abruptly today and refused blood work. Will continue with treatment plan as described above and will plan to increase the olanzapine to 30 mg and contin with depakote and will follow for response.   Pt is 25 yo M hx of psychotic illness and likely schizoaffective disorder presenting to [facility] w/ worsening agitation, getting into arguments with family and father. Pt remains irritable, especially when discussing his father. He has been requiring intermittent PRN's for agitation towards peers/staff.   Patient remains acutely ill with symptoms as noted above and requires acute inpatient care for acute treatment of psychosis.   Patient does not require constant observation at this time and will follow with routine checks. Patient continues to be delusional and disorganized.  His 939 is expiring today and he remains too ill to be safely discharged and will be converted to 9.27 today for further inpatient treatment. Will continue with treatment plan as described above and will plan to increase the olanzapine to 30 mg and continue with depakote 1500 mg and will follow for response.

## 2021-01-28 NOTE — BH INPATIENT PSYCHIATRY PROGRESS NOTE - MODIFICATIONS
Patient interviewed and evaluated to follow up on symptoms and the case has been reviewed with the treatment team this morning.   I was physically present during the service to the patient and personally examined the patient and was directly involved in the management and recommendations of the care provided to the patient.  I have reviewed the resident's progress note and the mental status examination and I agree with its contents and made changes as indicated

## 2021-01-29 PROCEDURE — 99231 SBSQ HOSP IP/OBS SF/LOW 25: CPT

## 2021-01-29 RX ORDER — POLYETHYLENE GLYCOL 3350 17 G/17G
17 POWDER, FOR SOLUTION ORAL DAILY
Refills: 0 | Status: DISCONTINUED | OUTPATIENT
Start: 2021-01-29 | End: 2021-02-10

## 2021-01-29 RX ADMIN — OLANZAPINE 30 MILLIGRAM(S): 15 TABLET, FILM COATED ORAL at 21:18

## 2021-01-29 RX ADMIN — DIVALPROEX SODIUM 1500 MILLIGRAM(S): 500 TABLET, DELAYED RELEASE ORAL at 21:18

## 2021-01-29 RX ADMIN — Medication 3 MILLIGRAM(S): at 22:45

## 2021-01-29 RX ADMIN — Medication 5 MILLIGRAM(S): at 21:18

## 2021-01-29 RX ADMIN — Medication 1 APPLICATION(S): at 22:47

## 2021-01-29 RX ADMIN — Medication 1 APPLICATION(S): at 08:53

## 2021-01-29 RX ADMIN — POLYETHYLENE GLYCOL 3350 17 GRAM(S): 17 POWDER, FOR SOLUTION ORAL at 13:00

## 2021-01-29 NOTE — BH INPATIENT PSYCHIATRY PROGRESS NOTE - NSBHASSESSSUMMFT_PSY_ALL_CORE
Pt is 27 yo M hx of bipolar d/o vs schizophrenia vs schizoaffective presenting to [facility] w/ worsening agitation, getting into arguments with family and father, not sleeping, irritable. Pt remains irritable, especially when discussing his father. He is oddly-related with intense stare, aggressive demeanor – ASPD traits. Requiring intermittent prns for agitation towards peers/staff. C/w Zyprexa 30mg, c/w  Depakote ER for mood and sedation. Pt requires inpatient hospitalization for safety, stabilization and medication optimization.       Plan  - Legals: 9.27  - Obs: routine,  no CO required ,pt denies SI/HI  - Psych: Zyprexa 30mg, and start Depakote ER  at 1500qhs. PRNS for agitation  - Medical: NTD

## 2021-01-29 NOTE — BH INPATIENT PSYCHIATRY PROGRESS NOTE - CASE SUMMARY
Pt is 25 yo M hx of psychotic illness and likely schizoaffective disorder presenting to [facility] w/ worsening agitation, getting into arguments with family and father. Pt remains irritable, especially when discussing his father. He has been requiring intermittent PRN's for agitation towards peers/staff.   Patient remains acutely ill with symptoms as noted above and requires acute inpatient care for acute treatment of psychosis.   Patient does not require constant observation at this time and will follow with routine checks. Patient continues to be delusional and disorganized.  His 939 is expiring today and he remains too ill to be safely discharged and will be converted to 9.27 today for further inpatient treatment. Will continue with treatment plan as described above and will plan to increase the olanzapine to 30 mg and continue with depakote 1500 mg and will follow for response.   Pt is 25 yo M hx of psychotic illness and likely schizoaffective disorder presenting to [facility] w/ worsening agitation, getting into arguments with family and father. Pt remains irritable, especially when discussing his father. He has been requiring intermittent PRN's for agitation towards peers/staff.   Patient remains acutely ill with symptoms as noted above and requires acute inpatient care for acute treatment of psychosis.   Patient does not require constant observation at this time and will follow with routine checks. Patient continues to be delusional and disorganized.  Will continue with treatment plan as described above and will continue  olanzapine to 30 mg and continue with depakote 1500 mg and will follow for response.

## 2021-01-30 PROCEDURE — 99231 SBSQ HOSP IP/OBS SF/LOW 25: CPT

## 2021-01-30 RX ADMIN — Medication 5 MILLIGRAM(S): at 20:28

## 2021-01-30 RX ADMIN — OLANZAPINE 30 MILLIGRAM(S): 15 TABLET, FILM COATED ORAL at 20:28

## 2021-01-30 RX ADMIN — DIVALPROEX SODIUM 1500 MILLIGRAM(S): 500 TABLET, DELAYED RELEASE ORAL at 20:29

## 2021-01-30 RX ADMIN — Medication 1 APPLICATION(S): at 23:50

## 2021-01-30 RX ADMIN — Medication 3 MILLIGRAM(S): at 15:45

## 2021-01-30 NOTE — BH INPATIENT PSYCHIATRY PROGRESS NOTE - NSBHASSESSSUMMFT_PSY_ALL_CORE
Pt is 27 yo M hx of bipolar d/o vs schizophrenia vs schizoaffective presenting to [facility] w/ worsening agitation, getting into arguments with family and father, not sleeping, irritable. Pt remains irritable, especially when discussing his father. He is oddly-related with intense stare, aggressive demeanor – ASPD traits. Requiring intermittent prns for agitation towards peers/staff. C/w Zyprexa 30mg, c/w  Depakote ER for mood and sedation. Pt requires inpatient hospitalization for safety, stabilization and medication optimization.       Plan  - Legals: 9.27  - Obs: routine,  no CO required ,pt denies SI/HI  - Psych: Zyprexa 30mg, and start Depakote ER  at 1500qhs. PRNS for agitation  - Medical: NTD

## 2021-01-31 RX ADMIN — POLYETHYLENE GLYCOL 3350 17 GRAM(S): 17 POWDER, FOR SOLUTION ORAL at 11:02

## 2021-01-31 RX ADMIN — DIVALPROEX SODIUM 1500 MILLIGRAM(S): 500 TABLET, DELAYED RELEASE ORAL at 21:22

## 2021-01-31 RX ADMIN — Medication 3 MILLIGRAM(S): at 22:31

## 2021-01-31 RX ADMIN — OLANZAPINE 30 MILLIGRAM(S): 15 TABLET, FILM COATED ORAL at 21:22

## 2021-01-31 RX ADMIN — Medication 5 MILLIGRAM(S): at 21:22

## 2021-01-31 RX ADMIN — Medication 1 APPLICATION(S): at 11:07

## 2021-02-01 PROCEDURE — 99231 SBSQ HOSP IP/OBS SF/LOW 25: CPT

## 2021-02-01 RX ADMIN — Medication 3 MILLIGRAM(S): at 17:00

## 2021-02-01 RX ADMIN — DIVALPROEX SODIUM 1500 MILLIGRAM(S): 500 TABLET, DELAYED RELEASE ORAL at 20:34

## 2021-02-01 RX ADMIN — Medication 5 MILLIGRAM(S): at 20:33

## 2021-02-01 RX ADMIN — Medication 100 MILLIGRAM(S): at 21:26

## 2021-02-01 RX ADMIN — OLANZAPINE 30 MILLIGRAM(S): 15 TABLET, FILM COATED ORAL at 20:34

## 2021-02-01 NOTE — BH INPATIENT PSYCHIATRY PROGRESS NOTE - NSBHASSESSSUMMFT_PSY_ALL_CORE
Pt is 27 yo M hx of bipolar d/o vs schizophrenia vs schizoaffective presenting to [facility] w/ worsening agitation, getting into arguments with family and father, not sleeping, irritable. Pt remains irritable, especially when discussing his father. He is oddly-related with intense stare, aggressive demeanor – ASPD traits. Requiring intermittent prns for agitation towards peers/staff.       PLAN:   Patient continues to require inpatient treatment and care.   Will continue with treatment plan as described   - Legals: 9.27  - Obs: routine,  no CO required ,pt denies SI/HI  - Psych: Zyprexa 30mg, and Depakote ER  at 1500qhs. PRNS for agitation  - Medical: NTD  D/C planning-Home and outpt follow up

## 2021-02-02 PROCEDURE — 99231 SBSQ HOSP IP/OBS SF/LOW 25: CPT

## 2021-02-02 RX ORDER — ZOLPIDEM TARTRATE 10 MG/1
5 TABLET ORAL AT BEDTIME
Refills: 0 | Status: DISCONTINUED | OUTPATIENT
Start: 2021-02-02 | End: 2021-02-09

## 2021-02-02 RX ORDER — ZOLPIDEM TARTRATE 10 MG/1
5 TABLET ORAL AT BEDTIME
Refills: 0 | Status: DISCONTINUED | OUTPATIENT
Start: 2021-02-02 | End: 2021-02-02

## 2021-02-02 RX ORDER — ZOLPIDEM TARTRATE 10 MG/1
10 TABLET ORAL AT BEDTIME
Refills: 0 | Status: DISCONTINUED | OUTPATIENT
Start: 2021-02-02 | End: 2021-02-02

## 2021-02-02 RX ADMIN — ZOLPIDEM TARTRATE 5 MILLIGRAM(S): 10 TABLET ORAL at 22:32

## 2021-02-02 RX ADMIN — BENZOCAINE AND MENTHOL 1 LOZENGE: 5; 1 LIQUID ORAL at 13:50

## 2021-02-02 RX ADMIN — HALOPERIDOL DECANOATE 7.5 MILLIGRAM(S): 100 INJECTION INTRAMUSCULAR at 18:15

## 2021-02-02 RX ADMIN — Medication 50 MILLIGRAM(S): at 09:20

## 2021-02-02 RX ADMIN — Medication 50 MILLIGRAM(S): at 18:16

## 2021-02-02 RX ADMIN — Medication 2 MILLIGRAM(S): at 00:58

## 2021-02-02 RX ADMIN — Medication 3 MILLIGRAM(S): at 18:15

## 2021-02-02 RX ADMIN — DIVALPROEX SODIUM 1500 MILLIGRAM(S): 500 TABLET, DELAYED RELEASE ORAL at 22:06

## 2021-02-02 RX ADMIN — Medication 3 MILLIGRAM(S): at 09:19

## 2021-02-02 RX ADMIN — OLANZAPINE 30 MILLIGRAM(S): 15 TABLET, FILM COATED ORAL at 22:06

## 2021-02-02 RX ADMIN — Medication 5 MILLIGRAM(S): at 22:06

## 2021-02-02 NOTE — BH INPATIENT PSYCHIATRY PROGRESS NOTE - CASE SUMMARY
Pt is 27 yo M hx of bipolar d/o vs schizophrenia vs schizoaffective presenting to [facility] w/ worsening agitation, getting into arguments with family and father, not sleeping, irritable. Pt remains irritable, especially when discussing his father. He is oddly-related with intense stare, aggressive demeanor – ASPD traits. Requiring intermittent prns for agitation towards peers/staff.     Pt on olanzapine 30 and depakote 1500.  Refusing all blood work "wants to drink his blood instead"    Patient remains acutely ill with symptoms as noted above and requires acute inpatient care for acute treatment of psychosis.   Patient does not require constant observation at this time and will follow with routine checks. Will continue with treatment plan as described above and will follow for response.

## 2021-02-02 NOTE — BH INPATIENT PSYCHIATRY PROGRESS NOTE - NSBHASSESSSUMMFT_PSY_ALL_CORE
Pt is 25 yo M hx of bipolar d/o vs schizophrenia vs schizoaffective presenting to [facility] w/ worsening agitation, getting into arguments with family and father, not sleeping, irritable. Pt remains irritable, especially when discussing his father. He is oddly-related with intense stare, aggressive demeanor – ASPD traits. Requiring intermittent prns for agitation towards peers/staff.       PLAN:   Patient continues to require inpatient treatment and care.   Will continue with treatment plan as described   - Legals: 9.27  - Obs: routine,  no CO required ,pt denies SI/HI  - Psych: Zyprexa 30mg, and Depakote ER  at 1500qhs. PRNS for agitation  - Medical: NTD  D/C planning-Home and outpt follow up Pt is 27 yo M hx of bipolar d/o vs schizophrenia vs schizoaffective presenting to [facility] w/ worsening agitation, getting into arguments with family and father, not sleeping, irritable. Pt remains irritable, especially when discussing his father. He is oddly-related with intense stare, aggressive demeanor – ASPD traits. Requiring intermittent prns for agitation towards peers/staff.   Pt currentlly refusing blood work for VPA level. No evidence of intoxication, no evidence of bruising, jaundice.      PLAN:   Patient continues to require inpatient treatment and care.   Will continue with treatment plan as described   - Legals: 9.27  - Obs: routine,  no CO required ,pt denies SI/HI  - Psych: Zyprexa 30mg, and Depakote ER  at 1500qhs. PRNS for agitation  - Medical: NTD  D/C planning-Home and outpt follow up

## 2021-02-03 RX ADMIN — Medication 3 MILLIGRAM(S): at 10:09

## 2021-02-03 RX ADMIN — DIVALPROEX SODIUM 1500 MILLIGRAM(S): 500 TABLET, DELAYED RELEASE ORAL at 21:21

## 2021-02-03 RX ADMIN — OLANZAPINE 30 MILLIGRAM(S): 15 TABLET, FILM COATED ORAL at 21:22

## 2021-02-03 RX ADMIN — Medication 50 MILLIGRAM(S): at 10:09

## 2021-02-03 RX ADMIN — Medication 5 MILLIGRAM(S): at 21:22

## 2021-02-03 RX ADMIN — ZOLPIDEM TARTRATE 5 MILLIGRAM(S): 10 TABLET ORAL at 21:22

## 2021-02-03 RX ADMIN — Medication 10 MILLIGRAM(S): at 16:43

## 2021-02-03 RX ADMIN — POLYETHYLENE GLYCOL 3350 17 GRAM(S): 17 POWDER, FOR SOLUTION ORAL at 13:19

## 2021-02-03 NOTE — BH INPATIENT PSYCHIATRY PROGRESS NOTE - NSBHASSESSSUMMFT_PSY_ALL_CORE
Pt is 27 yo M hx of bipolar d/o vs schizophrenia vs schizoaffective presenting to [facility] w/ worsening agitation, getting into arguments with family and father, not sleeping, irritable. Pt remains irritable, especially when discussing his father. He is oddly-related with intense stare, aggressive demeanor – ASPD traits. Requiring intermittent prns for agitation towards peers/staff.   Pt currentlly refusing blood work for VPA level. No evidence of intoxication, no evidence of bruising, jaundice.      PLAN:   Patient continues to require inpatient treatment and care.   Will continue with treatment plan as described   - Legals: 9.27  - Obs: routine,  no CO required ,pt denies SI/HI  - Psych: Zyprexa 30mg, and Depakote ER  at 1500qhs. PRNS for agitation  - Medical: NTD  D/C planning-Home and outpt follow up

## 2021-02-04 PROCEDURE — 99231 SBSQ HOSP IP/OBS SF/LOW 25: CPT

## 2021-02-04 RX ADMIN — Medication 5 MILLIGRAM(S): at 20:41

## 2021-02-04 RX ADMIN — POLYETHYLENE GLYCOL 3350 17 GRAM(S): 17 POWDER, FOR SOLUTION ORAL at 12:21

## 2021-02-04 RX ADMIN — ZOLPIDEM TARTRATE 5 MILLIGRAM(S): 10 TABLET ORAL at 20:40

## 2021-02-04 RX ADMIN — Medication 1 APPLICATION(S): at 12:21

## 2021-02-04 RX ADMIN — Medication 3 MILLIGRAM(S): at 20:39

## 2021-02-04 RX ADMIN — OLANZAPINE 30 MILLIGRAM(S): 15 TABLET, FILM COATED ORAL at 20:39

## 2021-02-04 RX ADMIN — Medication 50 MILLIGRAM(S): at 17:16

## 2021-02-04 RX ADMIN — Medication 3 MILLIGRAM(S): at 12:20

## 2021-02-04 RX ADMIN — DIVALPROEX SODIUM 1500 MILLIGRAM(S): 500 TABLET, DELAYED RELEASE ORAL at 20:41

## 2021-02-04 NOTE — DIETITIAN INITIAL EVALUATION ADULT. - OTHER INFO
Patient admitted to [facility] d/t psychosis and threats of aggression/violence. Patient endorses good appetite. Denies any GI distress. Weights recorded: 1/30 260#, 1/23 206#, 1/14 180#. Patient states he currently weighs 220#. Patient not interested in diet education.

## 2021-02-04 NOTE — BH INPATIENT PSYCHIATRY PROGRESS NOTE - NSBHASSESSSUMMFT_PSY_ALL_CORE
Pt is 25 yo M hx of bipolar d/o vs schizophrenia vs schizoaffective presenting to [facility] w/ worsening agitation, getting into arguments with family and father, not sleeping, irritable. Pt remains irritable, especially when discussing his father. He is oddly-related with intense stare, aggressive demeanor – ASPD traits. Requiring intermittent prns for agitation towards peers/staff. C/w Zyprexa 30mg, c/w  Depakote ER for mood and sedation. Pt requires inpatient hospitalization for safety, stabilization and medication optimization.       Plan  - Legals: 9.27  - Obs: routine,  no CO required ,pt denies SI/HI  - Psych: Zyprexa 30mg, and start Depakote ER  at 1500qhs. PRNS for agitation  - Medical: NTD

## 2021-02-05 PROCEDURE — 99231 SBSQ HOSP IP/OBS SF/LOW 25: CPT

## 2021-02-05 RX ORDER — HYDROXYZINE HCL 10 MG
50 TABLET ORAL EVERY 6 HOURS
Refills: 0 | Status: DISCONTINUED | OUTPATIENT
Start: 2021-02-05 | End: 2021-02-10

## 2021-02-05 RX ORDER — HYDROXYZINE HCL 10 MG
50 TABLET ORAL EVERY 6 HOURS
Refills: 0 | Status: DISCONTINUED | OUTPATIENT
Start: 2021-02-05 | End: 2021-02-05

## 2021-02-05 RX ADMIN — ZOLPIDEM TARTRATE 5 MILLIGRAM(S): 10 TABLET ORAL at 21:07

## 2021-02-05 RX ADMIN — POLYETHYLENE GLYCOL 3350 17 GRAM(S): 17 POWDER, FOR SOLUTION ORAL at 16:41

## 2021-02-05 RX ADMIN — Medication 5 MILLIGRAM(S): at 21:07

## 2021-02-05 RX ADMIN — Medication 3 MILLIGRAM(S): at 07:06

## 2021-02-05 RX ADMIN — OLANZAPINE 30 MILLIGRAM(S): 15 TABLET, FILM COATED ORAL at 21:07

## 2021-02-05 NOTE — BH SCALES AND SCREENS - NSBPRSUNUTHOCON_PSY_ALL_CORE
5 = Moderately Severe – full delusional conviction, but delusion(s) has only occasional impact on behavior
3 = Mild – at times, patient questions his or her belief(s) (partial delusion)
4 = Moderate – full delusional conviction, but delusion(s) has little or no influence on behavior

## 2021-02-05 NOTE — BH TREATMENT PLAN - NSCMSPTSTRENGTHS_PSY_ALL_CORE
Assertive/Courageous/Expressive of emotions/Intact family/Supportive family
Flexibility/Future/goal oriented/Supportive family
Able to adapt/Intact employment/Supportive family

## 2021-02-05 NOTE — BH SCALES AND SCREENS - NSBPRSGRANDIOS_PSY_ALL_CORE
4 = Moderate – e.g., inflated self-esteem clearly out of proportion to the circumstance, or suspected grandiose delusion(s)
5 = Moderately Severe – e.g., a single (definite) encapsulated grandiose delusion, or multiple (definite) fragmentary grandiose delusions
5 = Moderately Severe – e.g., a single (definite) encapsulated grandiose delusion, or multiple (definite) fragmentary grandiose delusions

## 2021-02-05 NOTE — BH TREATMENT PLAN - NSTXDCOPNOINTERSW_PSY_ALL_CORE
SW attempts to engage with pt however pt remains symptomatic, refuses to talk about subsequent plans for d/c and aftercare.
SW continued to engage pt regularly, mood remains guarded,blunt with responses, SW was advised pt is sporadic with med compliance and needs constant support

## 2021-02-05 NOTE — BH TREATMENT PLAN - NSTXCAREGIVERPARTICIPATE_PSY_P_CORE
Family/Caregiver participated in development of after care plan
No, patient unwilling to involve family/caregiver
Family/Caregiver participated in defining interventions

## 2021-02-05 NOTE — BH TREATMENT PLAN - NSTXMEDICINTERPR_PSY_ALL_CORE
Patient has made minimal progress with accomplishing his rehabilitation goals this week, as patient has been intermittently compliant with medication.
Psychiatric rehabilitation staff will continue to meet patient individually to provide support, encouragement, and counseling in order for patient to attend daily symptoms management groups and demonstrate medication and treatment compliance by taking all medication as prescribed for improved symptoms management over seven days.
Patient will benefit from demonstrating medication compliance by day 7.  Patient will be encouraged to comply with medication via individual and group support.

## 2021-02-05 NOTE — BH TREATMENT PLAN - NSTXMEDICGOAL_PSY_ALL_CORE
Take all medications as prescribed

## 2021-02-05 NOTE — BH SCALES AND SCREENS - NSBPRSCONCDISORG_PSY_ALL_CORE
4 = Moderate - e.g., occasional irrelevant statements, infrequent use of neologisms, or moderate loosening of associations
3 = Mild - e.g., frequently vague, but the interview is able to progress smoothly
2 = Very Mild - e.g., somewhat vague, but of doubtful clinical significance

## 2021-02-05 NOTE — BH TREATMENT PLAN - NSTXPLANTHERAPYSESSIONSFT_PSY_ALL_CORE
01-29-21  Type of therapy: Music therapy,Peer advocate  Type of session: Individual  Level of patient participation: Engaged  Duration of participation: 15 minutes  Therapy conducted by: Psych rehab  Therapy Summary: Writer met with patient for an individual session in order to review progress towards psychiatric rehabilitation goals. Patient was verbal and cooperative during session. Patient is engaged in unit activities. Patient was not a behavioral management issue during past 7 days.     Patient has been attending some psychiatric rehabilitation groups in the past week. Patient has shown increased participation in groups. Patient has demonstrated improvement in mood. Patient reports decrease in resistance to treatment. Patient repots he has been feeling better. Patient denies SI/I/P/AH/VH/HI. Patient reports he feels “comfortable” in the unit. During session, patient seemed to be smiling randomly at times but denies AH/VH. Patient also denies paranoia. Patient reports he has been attending some of the groups. Per unit staff, patient has been more visible on the unit and is appropriate with peers and staff. Patient reports getting along with everyone on the unit. Writer explored coping skills with patient. Patient is unable to identify coping skills at this moment. Writer encourage patient to continue to explore effective coping skills. Patient was receptive. Patient reports “for now he enjoys sleeping”. Patient reports medication compliance over past 7 days.  
  01-22-21  --  Type of session: Individual  Level of patient participation: Resistance to participation  --  Therapy conducted by: Psych rehab  Therapy Summary: Writer attempted to meet with patient in order to review progress toward rehabilitaion goal. Patient was restless and appeared precocupied and was unwilling to sit for individual session. Patient continues to be intermittently compliant with medication. Patient continues to be tenuous and was pacing up and doen the hallway and through the dayroom. Patient does not appear to have any interactions with peers on the unit. Patient appears to have good hygiene and grooming. SI/HI/AH/VH could nto be assessed at this time, as patient was not cooeprative with assessment process.

## 2021-02-05 NOTE — BH TREATMENT PLAN - NSDCCRITERIA_PSY_ALL_CORE
will be in better behavioral control and will have a CGI improvement <2
CGI improvement <2
will be in better behavioral control and will have a CGI improvement <2

## 2021-02-05 NOTE — BH SCALES AND SCREENS - NSBPRSSUSPIC_PSY_ALL_CORE
4 = Moderate – more frequent suspiciousness, or transient ideas of reference
5 = Moderately Severe – pervasive suspiciousness, or frequent ideas of reference
5 = Moderately Severe – pervasive suspiciousness, or frequent ideas of reference

## 2021-02-05 NOTE — BH SCALES AND SCREENS - NSBPRSBLUNTAFFECT_PSY_ALL_CORE
4 = Moderate – e.g., as above, but more intense, prolonged or frequent
4 = Moderate – e.g., as above, but more intense, prolonged or frequent
5 = Moderately Severe – e.g., flattening of affect, including at least two of the three features, severe lack of facial expression, monotonous voice, or restricted body gestures

## 2021-02-05 NOTE — BH TREATMENT PLAN - NSTXPROBDCOPNO_PSY_ALL_CORE
DISCHARGE ISSUE - NON-ADHERENT WITH OUTPATIENT SERVICES
DISCHARGE ISSUE - NON-ADHERENT WITH OUTPATIENT SERVICES

## 2021-02-05 NOTE — BH INPATIENT PSYCHIATRY PROGRESS NOTE - NSBHASSESSSUMMFT_PSY_ALL_CORE
Pt is 25 yo M hx of bipolar d/o vs schizophrenia vs schizoaffective presenting to [facility] w/ worsening agitation, getting into arguments with family and father, not sleeping, irritable. Pt remains irritable, especially when discussing his father. He is oddly-related with intense stare, aggressive demeanor – ASPD traits. Requiring intermittent prns for agitation towards peers/staff. C/w Zyprexa 30mg, c/w  Depakote ER for mood and sedation. Pt requires inpatient hospitalization for safety, stabilization and medication optimization.       Plan  - Legals: 9.27  - Obs: routine,  no CO required ,pt denies SI/HI  - Psych: Zyprexa 30mg, and Depakote 1500 ER  at 1500qhs. 3/7.5/50 for agitation  - Medical: NTD

## 2021-02-05 NOTE — BH INPATIENT PSYCHIATRY PROGRESS NOTE - CASE SUMMARY
Pt is 25 yo M hx of bipolar d/o vs schizophrenia vs schizoaffective presenting to [facility] w/ worsening agitation, getting into arguments with family and father, not sleeping, irritable. Pt remains irritable, especially when discussing his father. He is oddly-related with intense stare, aggressive demeanor – ASPD traits. Requiring intermittent prns for agitation towards peers/staff.     Pt on olanzapine 30 and depakote 1500.  Refusing all blood work "wants to drink his blood instead"    Patient remains acutely ill with symptoms as noted above and requires acute inpatient care for acute treatment of psychosis.   Patient does not require constant observation at this time and will follow with routine checks. Will continue with treatment plan as described above and will follow for response.

## 2021-02-05 NOTE — BH TREATMENT PLAN - NSTXPATIENTPARTICIPATE_PSY_ALL_CORE
Patient participated in identification of needs/problems/goals for treatment
Patient participated in defining interventions
Patient participated in identification of needs/problems/goals for treatment

## 2021-02-05 NOTE — BH TREATMENT PLAN - NSTXPROBMEDIC_PSY_ALL_CORE
MEDICATION/TREATMENT NON-COMPLIANCE

## 2021-02-05 NOTE — BH SCALES AND SCREENS - NSBPRSMANNPOST_PSY_ALL_CORE
2 = Very Mild – odd behavior but of doubtful clinical significance, e.g., occasional unprompted smiling, infrequent lip movements
1 = Not Observed (Not present)
4 = Moderate – e.g., assumes yoga position for a brief period of time, infrequent tongue protrusions, rocking

## 2021-02-06 RX ADMIN — DIVALPROEX SODIUM 1500 MILLIGRAM(S): 500 TABLET, DELAYED RELEASE ORAL at 22:06

## 2021-02-06 RX ADMIN — Medication 1 APPLICATION(S): at 08:57

## 2021-02-06 RX ADMIN — Medication 2 MILLIGRAM(S): at 15:43

## 2021-02-06 RX ADMIN — Medication 5 MILLIGRAM(S): at 21:12

## 2021-02-06 RX ADMIN — ZOLPIDEM TARTRATE 5 MILLIGRAM(S): 10 TABLET ORAL at 21:12

## 2021-02-06 RX ADMIN — OLANZAPINE 30 MILLIGRAM(S): 15 TABLET, FILM COATED ORAL at 21:13

## 2021-02-07 RX ADMIN — Medication 5 MILLIGRAM(S): at 21:00

## 2021-02-07 RX ADMIN — ZOLPIDEM TARTRATE 5 MILLIGRAM(S): 10 TABLET ORAL at 21:00

## 2021-02-07 RX ADMIN — Medication 2 MILLIGRAM(S): at 13:49

## 2021-02-07 RX ADMIN — OLANZAPINE 30 MILLIGRAM(S): 15 TABLET, FILM COATED ORAL at 21:00

## 2021-02-07 RX ADMIN — DIVALPROEX SODIUM 1500 MILLIGRAM(S): 500 TABLET, DELAYED RELEASE ORAL at 21:00

## 2021-02-08 PROCEDURE — 99231 SBSQ HOSP IP/OBS SF/LOW 25: CPT

## 2021-02-08 RX ORDER — ACETAMINOPHEN 500 MG
650 TABLET ORAL EVERY 6 HOURS
Refills: 0 | Status: DISCONTINUED | OUTPATIENT
Start: 2021-02-08 | End: 2021-02-10

## 2021-02-08 RX ORDER — ZOLPIDEM TARTRATE 10 MG/1
5 TABLET ORAL AT BEDTIME
Refills: 0 | Status: DISCONTINUED | OUTPATIENT
Start: 2021-02-10 | End: 2021-02-10

## 2021-02-08 RX ADMIN — Medication 50 MILLIGRAM(S): at 13:35

## 2021-02-08 RX ADMIN — DIVALPROEX SODIUM 1500 MILLIGRAM(S): 500 TABLET, DELAYED RELEASE ORAL at 21:20

## 2021-02-08 RX ADMIN — Medication 650 MILLIGRAM(S): at 19:10

## 2021-02-08 RX ADMIN — OLANZAPINE 30 MILLIGRAM(S): 15 TABLET, FILM COATED ORAL at 21:20

## 2021-02-08 RX ADMIN — Medication 5 MILLIGRAM(S): at 21:20

## 2021-02-08 RX ADMIN — HALOPERIDOL DECANOATE 7.5 MILLIGRAM(S): 100 INJECTION INTRAMUSCULAR at 13:34

## 2021-02-08 RX ADMIN — ZOLPIDEM TARTRATE 5 MILLIGRAM(S): 10 TABLET ORAL at 21:21

## 2021-02-08 NOTE — BH INPATIENT PSYCHIATRY PROGRESS NOTE - NSBHATTENDATTEST_PSY_ALL_CORE
I have personally seen, examined and participated in the care of this patient. I have reviewed all pertinent clinical information, including history, physical exam, plan and the Medical/PA/NP Student’s note and agree except as noted.

## 2021-02-08 NOTE — BH INPATIENT PSYCHIATRY DISCHARGE NOTE - NSDCCPCAREPLAN_GEN_ALL_CORE_FT
PRINCIPAL DISCHARGE DIAGNOSIS  Diagnosis: Bipolar disorder, curr episode mixed, severe, with psychotic features  Assessment and Plan of Treatment:       SECONDARY DISCHARGE DIAGNOSES  Diagnosis: Cannabis dependence  Assessment and Plan of Treatment:     Diagnosis: Cluster B personality disorder in adult  Assessment and Plan of Treatment:      PRINCIPAL DISCHARGE DIAGNOSIS  Diagnosis: Schizoaffective disorder, bipolar type  Assessment and Plan of Treatment:       SECONDARY DISCHARGE DIAGNOSES  Diagnosis: Cannabis dependence  Assessment and Plan of Treatment:     Diagnosis: Cluster B personality disorder in adult  Assessment and Plan of Treatment:

## 2021-02-08 NOTE — BH INPATIENT PSYCHIATRY DISCHARGE NOTE - NSDCMRMEDTOKEN_GEN_ALL_CORE_FT
risperiDONE 3 mg oral tablet: 2 tab(s) orally once a day (at bedtime)    divalproex sodium 500 mg oral tablet, extended release: 3 tab(s) orally once a day (at bedtime)  melatonin 3 mg oral tablet:  orally   OLANZapine 15 mg oral tablet: 2 tab(s) orally once a day (at bedtime)  zolpidem 5 mg oral tablet: 1 tab(s) orally once a day (at bedtime) MDD:10 mg

## 2021-02-08 NOTE — BH INPATIENT PSYCHIATRY DISCHARGE NOTE - NSBHMETABOLIC_PSY_ALL_CORE_FT
BMI:   HbA1c: A1C with Estimated Average Glucose Result: 4.8 % (01-15-21 @ 09:03)    Glucose:   BP: 141/66 (02-08-21 @ 08:22) (101/65 - 147/60)  Lipid Panel: Date/Time: 01-15-21 @ 09:03  Cholesterol, Serum: 133  Direct LDL: --  HDL Cholesterol, Serum: 47  Total Cholesterol/HDL Ration Measurement: --  Triglycerides, Serum: 59

## 2021-02-08 NOTE — BH INPATIENT PSYCHIATRY DISCHARGE NOTE - HPI (INCLUDE ILLNESS QUALITY, SEVERITY, DURATION, TIMING, CONTEXT, MODIFYING FACTORS, ASSOCIATED SIGNS AND SYMPTOMS)
Per ED note: "Patient is a 25 year old Sinhala man, lives with family but has been staying at a hotel for the last 2 weeks, unemployed, with documented past psychiatric diagnoses of mood disorder, ADHD, Oppositional defiant disorder, Disruptive behavior disorder, THC dependence, Bipolar disorder, MDD, Panic disorder, and psychosis with two hospitalizations from [facility] in Jan 2020 and [facility] in June 2020, no history of suicide attempts, presents to the hospital after increasingly strange behavior and threatening to kill father.     Pt seen in his room, lying down. When questions were asked, patient consistently replied "I don't know". He refused to answer questions about what brought him to the hospital and events leading up to it as well as his mood. He says he is not in a good place right now but does not wish to elaborate further. Pt agitated and irritable and acts paranoid and distrustful of interviewer and multiple times said "I don't want to talk to you". He then started to get agitated so the interview ended.   When patient re-approached and asked why he was brought to the hospital he said "my dad wouldn't suck my veronica". When asked why he would he said "to try" and "metaphorically". He kept repeating the same statement with a provocative smile so interview ended again.     Per father, pt stopped taking medication shortly after discharge from [facility] last summer. He never followed up with a psychiatrist outpatient. Since his discharge, pt has been increasingly paranoid, argumentative with father. Pt laughing to himself, never sleeping, talking to self. Father says pt threatened to kill him, which is what prompted him to call 911 for this current admission.    As patient is a poor historian most information was received from father.      Please see  note for additional collateral information obtained by ."    On interview, pt says he came to the hospital because he has been getting into arguments with is father. Says that his father is "trying to turn me into his little girl...but i'm not his bitch." He describes his father as "a traditional man" who doesn't understand things about modern society. "I want to live my own life, but he's trying to control me." Pt says he got into an argument with his father - cannot describe details of argument - and his father called the police on him. Says that his whole family is trying to "turn on me," and they try to sabotage my life. Pt says he has been sleeping 10 hrs per night, good appetite. Describes his mood as "great", no changes in concentration or feelings of guilt. Denies AVH/IOR. Says that he has "urges" to hurt his father, but no intent or plan. Says that he "must defend myself" but not clear what he is defending or why.    Of note, when pt asked about cut son R forearm, pt says he was trying to make "artwork" on my arm "like a tattoo" but didn't want to get a tattoo because " I might change my mind some day." Pt denies trying to harm himself.

## 2021-02-08 NOTE — BH INPATIENT PSYCHIATRY DISCHARGE NOTE - OTHER PAST PSYCHIATRIC HISTORY (INCLUDE DETAILS REGARDING ONSET, COURSE OF ILLNESS, INPATIENT/OUTPATIENT TREATMENT)
Per EMR pt has a hx of mood d/o, adhd, odd, disruptive d/o, bipolar MDD, psychosis,  had 2 prior inpt hospitalizations, last documented @ [facility] 6/20 , was linked outpt treatment subsequent to last discharge however family advised pt is non-compliant with tx, pt has no hx of SI/SA , some hx of AH/VH, can be disruptive and agitated when symptomatic, hx of being paranoid and mistrustful, has a hx of aggression and violent behavior, no legal issues, has no substance abuse hx, cannabis THC use, cigarettes

## 2021-02-08 NOTE — BH INPATIENT PSYCHIATRY PROGRESS NOTE - NSBHASSESSSUMMFT_PSY_ALL_CORE
Pt is 27 yo M hx of bipolar d/o vs schizophrenia vs schizoaffective presenting to [facility] w/ worsening agitation, getting into arguments with family and father, not sleeping, irritable. Pt remains irritable, especially when discussing his father. He is oddly-related with intense stare, aggressive demeanor – ASPD traits. Requiring intermittent prns for agitation towards peers/staff. C/w Zyprexa 30mg, c/w  Depakote ER for mood and sedation. Pt requires inpatient hospitalization for safety, stabilization and medication optimization.       Plan  - Legals: 9.27  - Obs: routine,  no CO required ,pt denies SI/HI  - Psych: Zyprexa 30mg, and Depakote 1500 ER  at 1500qhs. 3/7.5/50 for agitation  - Medical: NTD

## 2021-02-08 NOTE — BH INPATIENT PSYCHIATRY DISCHARGE NOTE - HOSPITAL COURSE
patient hospitalized on [address] [place] [address] from 1/14/2021 through 2/10/2021    Patient initially was uncooperative and paranoid about his father and would end any discussion if that relationship was discussed.  Agreed to medications and asked for help with sleep. Was started on olanzapine which was titrated up to 30 mg HS and depakote which was titrated up to 1500 mg HS.  Even with these meds patient complained of insomnia and he had been on ambien 10 in the past and ambien 5 mg was added with good effect.    Patient did agree to an case manger and with a future goal of independent living without the support of his father        Suicide and risk assessment performed prior to discharge. The patient has a low acute risk and low chronic risk of self-harm and aggression towards others. Protective factors include denying SI, no SIB, denying HI, good social supports in their family, no substance abuse, no current mood symptoms, no hopelessness, future-oriented in returning to home, no access to firearms.  Risk factors include presenting illness. Immediate risk was minimized by inpatient admission to a safe environment with appropriate supervision and limited access to lethal means. Future risk was minimized before discharge by treatment of acute episode, maximizing outpatient support, providing relevant patient education, discussing emergency procedures, and ensuring close follow-up. The patient remains at a low risk of self-harm, and such risk cannot be further ameliorated by continued inpatient treatment and the patient is therefore appropriate for discharge.       There were no behavioral problems on the unit.  Patient did not become agitated and did not require emergent intramuscular medications or seclusion / restraints.  Patient did not self-harm on the unit.  Patient remained actively engaged in treatment.  Patient participated in individual, group, and milieu therapy.  Patient got along appropriately with staff and peers.   Patient did not have any medical problems during this hospitalization.  There were no medical consultations.    A full discussion of the factors that predict treatment success and relapse was held including safety planning.  A discussion of the risks and benefits of patient’s medication was held including a discussion of the metabolic risks and risk of EPS and TD was done       The patient has improved significantly and no longer requires inpatient treatment and care. Patient denies all suicidal and aggressive ideation, intent and plan. Patient denies anxiety symptoms and panic attacks. Patient is not judged to be an acute danger to self or others at this time. Patient will be discharged today to home and outpatient follow up.     patient hospitalized on [address] [place] [address] from 1/14/2021 through 2/10/2021    Patient initially was uncooperative and paranoid about his father and would end any discussion if that relationship was discussed.  Agreed to medications and asked for help with sleep. Was started on olanzapine which was titrated up to 30 mg HS and depakote which was titrated up to 1500 mg HS.  Even with these meds patient complained of insomnia and he had been on ambien 10 in the past and ambien 5 mg was added with good effect.    Patient did agree to an case manger and with a future goal of independent living without the support of his father        Suicide and risk assessment performed prior to discharge. The patient has a low acute risk and low chronic risk of self-harm and aggression towards others. Protective factors include denying SI, no SIB, denying HI, good social supports in their family, no substance abuse, no current mood symptoms, no hopelessness, future-oriented in returning to home, no access to firearms.  Risk factors include presenting illness. Immediate risk was minimized by inpatient admission to a safe environment with appropriate supervision and limited access to lethal means. Future risk was minimized before discharge by treatment of acute episode, maximizing outpatient support, providing relevant patient education, discussing emergency procedures, and ensuring close follow-up. The patient remains at a low risk of self-harm, and such risk cannot be further ameliorated by continued inpatient treatment and the patient is therefore appropriate for discharge.       There were no behavioral problems on the unit.  Patient did not become agitated and did not require emergent intramuscular medications or seclusion / restraints.  Patient did not self-harm on the unit.  Patient remained actively engaged in treatment.  Patient participated in individual, group, and milieu therapy.  Patient got along appropriately with staff and peers.   Patient did not have any medical problems during this hospitalization.  There were no medical consultations.    A full discussion of the factors that predict treatment success and relapse was held including safety planning.  A discussion of the risks and benefits of patient’s medication was held including a discussion of the metabolic risks and risk of EPS and TD was done       The patient has improved significantly and no longer requires inpatient treatment and care. Patient denies all suicidal and aggressive ideation, intent and plan. Patient denies anxiety symptoms and panic attacks. Patient is not judged to be an acute danger to self or others at this time. Patient will be discharged today to home and outpatient follow up.

## 2021-02-09 VITALS — TEMPERATURE: 99 F

## 2021-02-09 RX ORDER — ZOLPIDEM TARTRATE 10 MG/1
1 TABLET ORAL
Qty: 15 | Refills: 0
Start: 2021-02-09 | End: 2021-02-23

## 2021-02-09 RX ORDER — OLANZAPINE 15 MG/1
2 TABLET, FILM COATED ORAL
Qty: 30 | Refills: 0
Start: 2021-02-09 | End: 2021-02-23

## 2021-02-09 RX ORDER — DIVALPROEX SODIUM 500 MG/1
3 TABLET, DELAYED RELEASE ORAL
Qty: 45 | Refills: 0
Start: 2021-02-09 | End: 2021-02-23

## 2021-02-09 RX ORDER — LANOLIN ALCOHOL/MO/W.PET/CERES
0 CREAM (GRAM) TOPICAL
Qty: 0 | Refills: 0 | DISCHARGE
Start: 2021-02-09

## 2021-02-09 RX ADMIN — Medication 2 MILLIGRAM(S): at 21:10

## 2021-02-09 RX ADMIN — ZOLPIDEM TARTRATE 5 MILLIGRAM(S): 10 TABLET ORAL at 21:10

## 2021-02-09 RX ADMIN — Medication 50 MILLIGRAM(S): at 17:44

## 2021-02-09 RX ADMIN — HALOPERIDOL DECANOATE 7.5 MILLIGRAM(S): 100 INJECTION INTRAMUSCULAR at 17:44

## 2021-02-09 RX ADMIN — Medication 5 MILLIGRAM(S): at 21:09

## 2021-02-09 RX ADMIN — OLANZAPINE 30 MILLIGRAM(S): 15 TABLET, FILM COATED ORAL at 21:10

## 2021-02-09 RX ADMIN — DIVALPROEX SODIUM 1500 MILLIGRAM(S): 500 TABLET, DELAYED RELEASE ORAL at 21:09

## 2021-02-09 NOTE — BH DISCHARGE NOTE NURSING/SOCIAL WORK/PSYCH REHAB - NSBHDCADDR1FT_A_CORE
Please be advised, this is a telehealth appointment by phone, the provider will contact you on the date and time indicated*

## 2021-02-09 NOTE — BH DISCHARGE NOTE NURSING/SOCIAL WORK/PSYCH REHAB - NSDCPRRECOMMEND_PSY_ALL_CORE
Psychiatric Rehabilitation staff recommends that patient begins outpatient psychiatric treatment for ongoing medication management, support, and psychotherapy. In addition to, continuing exploring and utilizing coping skills for improved symptom management and sustained recovery.

## 2021-02-09 NOTE — BH DISCHARGE NOTE NURSING/SOCIAL WORK/PSYCH REHAB - PATIENT PORTAL LINK FT
You can access the FollowMyHealth Patient Portal offered by [facility] by registering at the following website: [email or web address]. By joining [facility]’s FollowMyHealth portal, you will also be able to view your health information using other applications (apps) compatible with our system.

## 2021-02-09 NOTE — BH DISCHARGE NOTE NURSING/SOCIAL WORK/PSYCH REHAB - NSBHDCAPPT1DT_PSY_A_CORE
Let me know if you haven't gotten the records that you need and we can help.    Talk to your pharmacist about the new shingles shot and the flu shot.  You could also get a pneumonia shot shot - the prevnar shot.       Your blood pressure was a little high - continue to work on healthy lifestyle and we will recheck in 3 months.    They will call you to schedule with the weight loss clinic.      Switch the zantac to pepcid.      Patient Education   Personalized Prevention Plan  You are due for the preventive services outlined below.  Your care team is available to assist you in scheduling these services.  If you have already completed any of these items, please share that information with your care team to update in your medical record.  Health Maintenance Due   Topic Date Due     Zoster (Shingles) Vaccine (1 of 2) 08/29/2002     Flu Vaccine (1) 09/01/2020     Pneumococcal Vaccine (2 of 2 - PCV13) 04/23/2020       Preventing Falls in the Home  An adult or child can fall for many reasons. If you are an older adult, you may fall because your reaction time slows down. Your muscles and joints may get stiff, weak, or less flexible because of illness, medicines, or a physical condition. These things can also make a child more likely to fall or be injured in a fall.  Other health problems that make falls more likely include:    Arthritis    Dizziness or lightheadedness when you get out of bed (orthostatic hypotension)    History of a stroke    Dizziness    Anemia    Certain medicines taken for mental illness or to control blood pressure.    Problems with balance or gait    Bladder or urinary problems    History of falls with or without an injury    Changes in vision (vision impairment)    Changes in thinking skills and memory (cognitive impairment)  Injuries from a fall can include broken bones, dislocated joints, internal bleeding and cuts. When these injuries are serious enough, they can make it impossible for you or a  child who is injured in a fall to live on his or her ownhome.  Prevention tips  To help prevent falls and fall-related injuries, follow the tips below.   Floors  Make floors safer by doing the following:     Put nonskid pads under area rugs.    Remove throw rugs.    Replace worn floor coverings.    Tack carpets firmly to each step on carpeted stairs. Put nonskid strips on the edges of uncarpeted stairs.    Keep floors and stairs free of clutter and cords.    Arrange furniture so there are clear pathways.    Clean up any spills right away.    Wear shoes that fit.  Bathrooms    Make bathrooms safer by doing the following:     Install grab bars in the tub or shower.    Apply nonskid strips or put a nonskid rubber mat in the tub or shower.    Sit on a bath chair to bathe.    Use bathmats with nonskid backing.  Lighting and the environment  Improve lighting in your home by doing the following:     Keep a flashlight in each room. Or put a lamp next to the bed within easy reach.    Put nightlights in the bedrooms, hallways, kitchen, and bathrooms.    Make sure all stairways have good lighting.    Take your time when going up and down stairs.    Put handrails on both sides of stairs and in walkways for more support. To prevent injury to your wrist or arm, don t use handrails to pull yourself up.    Install grab bars to pull yourself up.    Move or rearrange items that you use often. This will make them easier to find or reach.    Look at your home to find any safety hazards. Especially look at doorways, walkways, and the driveway. Remove or repair any safety problems that you find.  Date Last Reviewed: 8/1/2016 2000-2019 [employer]. [address] [facility], [place] [address]. All rights reserved. This information is not intended as a substitute for professional medical care. Always follow your healthcare professional's instructions.             Depression and Suicide in Older Adults    Nearly 2 million  older Americans have some type of depression. Sadly, some of them even take their own lives. Yet depression among older adults is often ignored. Learn the warning signs. You may help spare a loved one needless pain. You may also save a life.  What is depression?  Depression is a serious illness that affects the way you think and feel. It is not a normal part of aging, nor is it a sign of weakness, a character flaw, or something you can snap out of. Most people with depression need treatment to get better. The most common symptom is a feeling of deep sadness. People who are depressed also may seem tired and listless. And nothing seems to give them pleasure. It s normal to grieve or be sad sometimes. But sadness lessens or passes with time. Depression rarely goes away or improves on its own. Other symptoms of depression are:    Sleeping more or less than normal    Eating more or less than normal    Having headaches, stomachaches, or other pains that don t go away    Feeling nervous,  empty,  or worthless    Crying a great deal    Thinking or talking about suicide or death    Feeling confused or forgetful  What causes it?  The causes of depression aren t fully known. But, it is thought to result from a complex interaction of biochemistry, genetics, environmental factors, and personality. Certain chemicals in the brain play a role. Depression does run in families. And life stresses can also trigger depression in some people. Older adults often face many stressors, such as death of friends or a spouse, health problems, and financial concerns.  How you can help  Often, depressed people may not want to ask for help. When they do, they may be ignored. Or, they may receive the wrong treatment. You can help by showing parents and older friends love and support. If they seem depressed, help them find the right treatment. Talk to your doctor. Or contact a local mental health center, social service agency, or hospital. With  modern treatment, no one has to suffer from depression.  If your older friend or family member agrees, you can be an advocate for him or her in the healthcare setting. Many times, older adults have other chronic illnesses such as diabetes, heart disease, or cancer that can cause symptoms of depression. Medicine side effects can also contribute to certain behaviors and feelings. It is important that the older adult's healthcare provider listens and sorts out the causes of any symptoms of depression and makes referrals to mental health specialists when needed. Untreated depression can result in misdiagnosis, including brain disorders such as dementia and Alzheimer's. If the health professional does not take the issue of depression seriously, ask your family member or friend to consider finding another provider.  Resources    National Suicide Prevention Lifeline (crisis hotline)556-643-VUJY (3944)    National [place] of Mental Widvlm465-608-4631fuv.[facility].nih.gov    National [place] on Mental Zxkqohm371-571-5867nsh.juan.org    Mental Health Yalqyms851-182-6818pia.[facility].org    National Suicide Ymodeaa880-267-8028 (800-SUICIDE)   Date Last Reviewed: 2/1/2017 2000-2019 [employer]. [address] [place] [address]. All rights reserved. This information is not intended as a substitute for professional medical care. Always follow your healthcare professional's instructions.        Patient Education     Lowering Your Blood Pressure with DASH  What is the DASH eating plan?  DASH (Dietary Approaches to Stop Hypertension) can help you prevent or lower high blood pressure. This eating plan provides the nutrients that help lower blood pressure: potassium, magnesium, calcium, protein and fiber.   If not controlled, high blood pressure may lead to heart disease, stroke or blindness.  This eating plan is rich in:     Fruits and vegetables    Whole grains    Fat-free or low-fat milk products    Fish and  poultry (chicken, turkey, etc.)    Beans, seeds and nuts.  This eating plan is low in:     Salt and sodium    Sugar, sweets and drinks with sugar    Red meat, saturated fat and trans fat.  Lifestyle changes  Besides a healthy eating plan, other steps are needed to help control high blood pressure.     Stay at a healthy weight.    Be active for at least 30 minutes on most days.    If you drink alcohol, have no more than two drinks per day (for men) or one per day (for women).    If you take blood pressure medicine, take it as directed.  Losing weight with DASH  You can lose weight by eating fewer calories. It is best to take off pounds slowly over time. Talk to a dietitian about the best way to do this.  Staying active   To shed pounds, combine DASH with daily exercise. Start with a 15-minute walk each day. Slowly increase the time until you reach a total of 30 minutes on most days. To avoid weight gain, try for 60 minutes each day. Check with your doctor before starting any exercise program.  Tips for less sodium    Avoid processed foods. These may include baked goods, cereals, soy sauce and even antacids.    Cook with less salt. Don't bring the saltshaker to the table.    Season food with herbs, spices, lemon, lime, vinegar, wine and salt-free seasoning blends.    Use low-sodium canned vegetables or fruits.  Tips to ease the change  Take a week or two to slowly make changes to your diet.    Add a serving of vegetables at lunch one day. The next day, add a serving at dinner as well.    Add fruit to one meal or eat it as a snack.    Work up to three servings of fat-free and low-fat milk products each day.    If you eat large portions of meat, cut back by a third at each meal. The goal is to eat 6 oz (ounces) of meat or less per day.    Serve brown rice and whole-wheat bread and pasta.    Try casseroles and stir-blue dishes that have less meat and more vegetables, grains or cooked dry beans.    Serve two or more  meatless meals each week.    For snacks and desserts, eat foods low in fat, sodium and sugar, such as:  ? Unsalted rice cakes, nuts or seeds  ? Fresh fruits, raw vegetables and raisins  ? Fat-free, low-fat or frozen yogurt  ? Popcorn with no salt or butter.    If you have trouble digesting milk products, try lactose-free milk or take lactase pills.    If you have a nut allergy, eat seeds, beans, lentils or split peas.    Fruits, vegetables and whole grain foods are high in fiber. To avoid bloating and diarrhea (loose, watery stools), increase these foods over several weeks.  The DASH eating plan  Use this chart to plan your meals. Or take it with you when you shop for food.   Food Group Servings per Day  Serving Size Examples    1,600 Calories 2,000 Calories 2,600 Calories      Grains  (whole wheat) 6 6 to 8 10 to 11   1 slice bread    1 oz dry cereal      cup cooked rice, pasta or cereal Whole-wheat bread and rolls, whole-wheat pasta, English muffin, sabrina bread, bagel, cereals, grits, oatmeal, brown rice, unsalted pretzels and popcorn   Vegetables  3 to 4 4 to 5 5 to 6   1 cup raw leafy vegetables      cup cut-up raw or cooked vegetable      cup vegetable juice Broccoli, carrots, collards, green beans, green peas, kale, lima beans, potatoes, spinach, squash, sweet potatoes, tomatoes   Fruits 4 4 to 5 5 to 6   1 medium fruit      cup dried fruit      cup fresh, frozen, or canned fruit      cup fruit juice Apples, apricots, bananas, dates, grapes, oranges, grapefruit, mangoes, melons, peaches, pineapples, raisins, strawberries, tangerines   Fat-free or   low-fat milk and milk products 2 to 3 2 to 3 3   1 cup milk or yogurt    1   oz cheese Fat-free or low-fat (1%) milk, buttermilk, cheese, regular or frozen yogurt   Lean meats, poultry and fish 3 to 6 6 or less 6   1 oz cooked meats, poultry (chicken, turkey) or fish    1 egg    2 egg whites Lean meats (trim away any fat, then broil, roast or poach); remove skin from  poultry. Eggs are high in cholesterol, so limit egg yolks to four or less per week.   Nuts, seeds   and legumes 3 per week 4 to 5 per week 1 per day   ? cup (1   oz) nuts    2 Tbsp peanut butter    2 Tbsp (   oz) seeds      cup cooked legumes (dry beans and peas) Almonds, hazelnuts, mixed nuts, peanuts, walnuts, sunflower seeds, peanut butter, kidney beans, lentils, split peas   Fats and oils 2 2 to 3 3   1 tsp soft margarine    1 tsp vegetable oil    1 Tbsp hanson    2 Tbsp salad dressing Soft margarine, vegetable oil (such as canola, corn, olive or safflower), low-fat mayonnaise, light salad dressing   Sweets and   added sugars 0 5 or less per week 2 or less per day   1 Tbsp sugar, jelly or jam      cup sorbet or gelatin    1 cup lemonade Fruit-flavored gelatin, fruit punch, hard candy, jelly, maple syrup, sorbets and ices   A sample meal plan  This sample menu gives two sodium levels. Start with 2,300 mg of sodium (about 1 teaspoon of table salt per day). Then, try to lower your intake to 1,500 mg a day. Talk to your doctor or dietitian about how to do this.   These samples are for people who eat 2,000 calories per day. The less salt you eat, the more you may lower your blood pressure.   Menu for 2,300 mg sodium per day   Breakfast:   cup instant oatmeal, 1 mini whole-wheat bagel, 1 tablespoon peanut butter, 1 medium banana, 1 cup (8 ounces) low-fat milk.   Lunch: 1 cup cantaloupe chunks, 1 cup apple juice and one chicken breast sandwich that includes:    Two slices (3 ounces) chicken breast, no skin    Two slices whole-wheat bread    1 slice (   ounce) reduced-fat cheddar cheese    One leaf smiley lettuce    Two slices tomato    1 tablespoon low-fat mayonnaise.  Dinner: 1 cup cooked spaghetti,   cup low-salt vegetarian spaghetti sauce, 3 tablespoons Parmesan cheese;   cup corn (from frozen);   cup canned pears in juice; one spinach salad that includes:    1 cup fresh spinach leaves      cup fresh carrots,  "grated      cup fresh mushrooms, sliced    1 tablespoon vinegar and oil dressing.  Snacks:? cup unsalted almonds;   cup dried apricots; 1 cup fat-free fruit yogurt, no sugar added.  Reducing to 1,500 mg sodium per day  Use the same menu plan, but:    For breakfast, replace instant oatmeal with regular oatmeal and 1 teaspoon cinnamon.    For lunch, replace cheddar cheese with low-sodium Swiss cheese.  Resources on diet and health  National Heart, Lung and Blood [place]  Counts include 234 beds at the [facility]  [address] [address]   [place] [address]   Phone: [phone]   TTY: [phone]   www.nhlbi.nih.gov   \"Aim for a Healthy Weight\"   www.nhlbi.nih.gov/health/public/heart/obesity/lose_wt/index.htm  \"Dietary Guidelines for Americans 2005\"   and \"A Healthier You\"   www.healthierus.gov/dietaryguidelines  For informational purposes only. Not to replace the advice of your health care provider. Adapted from \"Your Guide to Lowering Blood Pressure with DASH,\" by the National Heart, Lung and Blood [place],   NIH Publication No. , revised April 2006. Available at www.nhlbi.nih.gov/health/public/heart/hbp/dash/index.htm. Published by [employer]. [place] 021815 - REV 09/15.  For informational purposes only. Not to replace the advice of your health care provider.  Copyright   2018 [employer]. All rights reserved.           " 18-Feb-2021 09:00

## 2021-02-09 NOTE — BH DISCHARGE NOTE NURSING/SOCIAL WORK/PSYCH REHAB - NSCDUDCCRISIS_PSY_A_CORE
[facility]  1 (054) [facility] ([phone])  Text "WELL" to 58149  Website: www.[employer]/.[employer] [facility] 1 (674) 541-SEMF (8368) Website: www.safehorizon.org/.National Suicide Prevention Lifeline 0 (346) 372-9479/.  [employer]  1 (854) LIFENET (684-5551)/.  [facility]  [address] [place] [address] [phone]   Hours:  Monday through Friday from 9 AM to 3 PM/.  [place] [employer] of  Affairs - Veterans Crisis Line  0 [phone], Option 1

## 2021-02-09 NOTE — BH DISCHARGE NOTE NURSING/SOCIAL WORK/PSYCH REHAB - NSDCPRGOAL_PSY_ALL_CORE
Pt has demonstrated significant progress towards psychiatric rehabilitation goals during the current hospitalization. Pt was able to identify, listening to music, work out, praying, breathing deeply and remembering good things as healthy coping strategies to better manage symptoms. Pt has been compliant with medications and is tolerating them well. Pt endorses improvement in mood, sleep, and appetite. Writer and pt engaged in safety planning which can be reviewed in the  Safety Plan document. Pt was provided with a copy prior to discharge. Pt denies any current AH/VH or paranoia. Pt also denies any SI/HI, intent, or plan at this time. Due to the COVID-19 pandemic, unit structure and activities are re-evaluated on a consistent basis in effort to maintain the safety of pts and staff. Pt has been increasingly receptive to 1:1 sessions throughout hospitalization. Pt engaged in various groups including DBT, symptom management, coping skills, peer advocate groups, and creative arts therapy groups. Pt was visible on the unit and demonstrated an increase in positive socialization with select peers. Pt was provided with a Press Ganey survey to complete prior to discharge.

## 2021-02-10 PROCEDURE — 99238 HOSP IP/OBS DSCHRG MGMT 30/<: CPT

## 2021-02-10 NOTE — BH INPATIENT PSYCHIATRY PROGRESS NOTE - NSTXMEDICGOAL_PSY_ALL_CORE
Take all medications as prescribed

## 2021-02-10 NOTE — BH INPATIENT PSYCHIATRY PROGRESS NOTE - NSBHCONSULTIPREASON_PSY_A_CORE
danger to others; mental illness expected to respond to inpatient care

## 2021-02-10 NOTE — BH INPATIENT PSYCHIATRY PROGRESS NOTE - NSTXPROBDCOPNO_PSY_ALL_CORE
DISCHARGE ISSUE - NON-ADHERENT WITH OUTPATIENT SERVICES

## 2021-02-10 NOTE — BH INPATIENT PSYCHIATRY PROGRESS NOTE - NSTXDCOPNOGOAL_PSY_ALL_CORE
Will agree to participate in appropriate outpatient care

## 2021-02-10 NOTE — BH INPATIENT PSYCHIATRY PROGRESS NOTE - NSTXMEDICPROGRES_PSY_ALL_CORE
Improving
Improving
No Change
No Change
Improving
No Change
Improving
No Change
Improving
No Change
Improving
Improving
No Change
Improving

## 2021-02-10 NOTE — BH INPATIENT PSYCHIATRY PROGRESS NOTE - NSICDXBHPRIMARYDX_PSY_ALL_CORE
Kari   F30.9  
Bipolar disorder, curr episode mixed, severe, with psychotic features   F31.64  
Bipolar disorder, curr episode mixed, severe, with psychotic features   F31.64  
Kari   F30.9  
Bipolar disorder, curr episode mixed, severe, with psychotic features   F31.64  
Kari   F30.9  
Schizophrenia   F20.9  
Bipolar disorder, curr episode mixed, severe, with psychotic features   F31.64  
Kari   F30.9  
Kari   F30.9  
Bipolar disorder, curr episode mixed, severe, with psychotic features   F31.64  
Kari   F30.9  
Bipolar disorder, curr episode mixed, severe, with psychotic features   F31.64

## 2021-02-10 NOTE — BH INPATIENT PSYCHIATRY PROGRESS NOTE - NSBHFUPINTERVALHXFT_PSY_A_CORE
On interview, pt says he was feeling paranoid last night- he thought someone was staring at him…but he realizes now that it was a nurse and not someone trying to harm him. But as a result of taking PRN, pt refused hs medication. Says “My body is sensitive and can’t take that much medication.” Pt says he is 70% human, 30% coronado and therefore needs different medication.   Pt denies SI/HI. No medication side effects reported including lethargy, dizziness, changes in vision, nausea, tremor  
Pt seen for psychosis, no acute events overnight took meds as prescribed    On interview, pt says he doesn't like the size of the depakote pills. He denies SI/HI. Doesn't feel threatened on unit
Pt seen for psychosis, no acute events overnight took meds as prescribed    On interview, pt says he feels better
Pt seen for psychosis, no acute events overnight, no PRNs required. Pt slept poorly, waking up q3 hrs.    On interview, pt says he is feeling tired. Pt says he feels bored and there's nothing to do at the hospital so he sleeps all day. Says he wants to stay at hospital for a long time because "its better than where I live." Pt unwilling to c/w klonopin  because he doesn't feel like the depakote has had enough time to work. No SI/HI reported  
Patient seen for follow up of psychosis with paranoia and threatening behaviors.  Case reviewed with comprehensive treatment team.  Patient remains irritable, labile, refusing to discuss events preceding hospitalization.  He states he feels "powerful" as he laughs loudly and inappropriately.   Intense stare, yelling loudly with self-talk.    Patient is compliant with medications with no reported or observed side effects.  Sleep is improved with klonopin.  He is eating well. 
On interview, pt says he was feeling paranoid last night- he thought someone was staring at him…but he realizes now that it was a nurse and not someone trying to harm him. But as a result of taking PRN, pt refused hs medication. Says “My body is sensitive and can’t take that much medication.” Pt says he is 70% human, 30% coronado and therefore needs different medication.   Pt denies SI/HI. No medication side effects reported including lethargy, dizziness, changes in vision, nausea, tremor  
Pt seen for psychosis, no acute events overnight took meds as prescribed    Pt reports doing OK and unable to explain why he ended interview abruptly  yesterday when asked about father
On interview, pt reports feeling calm and that he is "enjoying life" because he feels relaxed. Pt notes he enjoys lying in bed in what he feels is the stress free environment of the hospital. 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 Pt also asks if his father has disowned him and reports that he is a "Black sheep" in the family. He denies feeling paranoid, anxious or depressed, but states that he receives prns because he gets irritable when he feels that staff is not smiling or acting like they are interested in the health of patient's. Pt refused his bloodwork, noting he will only give a sample if he can keep some and that he does not want to be "experimented on." Denies SI/HI, AH/VH. Denies side effects from meds.  
Pt seen for psychosis, no acute events overnight, pt sleep improved    Pt seen in his bedroom, sitting on edge of bed reading a book of poetry. Pt says his mood is "excellent." Pt says his sleep is better, does not feel lethargic. Pt saying that he avoids hanging out with people "because I don't know what to talk about. " Pt has not spoken to his father - " I don't have anything to say to him." Denies SI/HI.   
Pt seen for psychosis, this morning pt became agitated, yelling at staff and throwing furniture, received IM 3/7.5/50 ativan/haldol/benadryl. Pt reports poor sleep.    On interview, pt says he got upset this morning because "this gabriel is giving me the 'gays' and he creeps me out." Pt says the peer is following him around the unit. Pt says he didn't go to staff to complain because "its a small hospital and I can't complain about someone walking around." Pt also refusing blood work, says that he doesn't want to take klonopin anymore for sleep. Pt agrees to switch lithium to depakote because he doesn't want to give as much blood and its more sedating. Denies medication side effects  
On interview, pt asks interviewers to leave because he’s still eating “this shit food…whoever made this should get fired.” Pt refuses to speak with team. Later, when asked if pt would be willing to start lithium which would require a blood draw, pt says he would only comply with blood draw if they will take 2 vials and give one to him so that he can drink it.
Pt seen for psychosis, no acute events overnight took meds as prescribed    becomes frustrated with interview and feels he is "his father's prostitute" 
Pt seen for psychosis, no acute events overnight, pt required Ativan 3mg po at 5am this morning for anxiety    Pt seen in his bedroom, laying in bed fully clothed, pt says he was feeling anxious this morning and needed an Ativan. Says he doesn't know why he felt anxious, unable to identify a stressor. Pt reports getting along with everyone on the unit. No feelings of paranoia. No SI/HI. No medication side effects reported.  
Patient is casually dressed, animated, intense, staring eye contact,  PSA, yelling, refusing to cooperate with interview,  Speech is pressured. 
Patient seen for follow up of psychosis with paranoia and threatening behaviors.  Case reviewed with comprehensive treatment team.  Patient remains irritable, labile, unable to sit still for interview, pacing the unit, rambling about how his father "set him up." He states he feels "fine".   Intense stare, yelling loudly with self-talk.    Patient is compliant with medications with no reported or observed side effects.  Sleep is impaired.  He is eating well. 
Pt seen for psychosis, no acute events overnight took meds as prescribed, pt requesting multiple PRNs ativan    On interview, pt says he doesn't want to take Depakote, says its "too many big pills." Pt advised to stop requesting ATivan PRN multiple times during the day. Pt says he feels "so tired." denies SI/HI. Doesn't feel threatened on unit
On interview, pt says he is “happy to be here.” Says he doesn’t speak to the other patients but likes “being away from my family.”  Says he “likes” taking the Zyprexa and klonopin. Pt becomes agitated when speaking about his father, using profane sexual language. Interview abruptly ends 2/2 patient agitation.  
Pt seen for psychosis, no acute events overnight took meds as prescribed    On interview, pt reports feeling "OK"     Has been isolative on unit
Pt seen for psychosis, no acute events overnight took meds as prescribed    ends interview abruptly when asked about father
Pt seen for psychosis, no acute events overnight took meds as prescribed, poor sleep per pt check sheet    On interview, pt reports feeling "great" I Have a shitty life and so this is like paradise for me...my father treats me like a 16 year-old prostitute. Pt refuses blood work this morning unless he can keep a vial for himself "to drink later." Pt denies mood/psychotic sxs. No concerns with pts on the unit

## 2021-02-10 NOTE — [facility] INPATIENT PSYCHIATRY PROGRESS NOTE - CURRENT MEDICATION
MEDICATIONS  (STANDING):  BACItracin   Ointment 1 Application(s) Topical two times a day  diVALproex  milliGRAM(s) Oral at bedtime  influenza   Vaccine 0.5 milliLiter(s) IntraMuscular once  lidocaine 2% Viscous 30 milliLiter(s) Swish and Spit once  OLANZapine 25 milliGRAM(s) Oral at bedtime    MEDICATIONS  (PRN):  benzocaine 15 mG/menthol 3.6 mG (Sugar-Free) Lozenge 1 Lozenge Oral every 2 hours PRN sore throat  diphenhydrAMINE 50 milliGRAM(s) Oral every 4 hours PRN eps prophylaxis/agitation  diphenhydrAMINE   Injectable 50 milliGRAM(s) IntraMuscular once PRN eps ppx/agitation  haloperidol     Tablet 7.5 milliGRAM(s) Oral every 4 hours PRN psychotic agitation  haloperidol    Injectable 7.5 milliGRAM(s) IntraMuscular once PRN psychotic agitation  LORazepam     Tablet 3 milliGRAM(s) Oral every 4 hours PRN agitation/anxiety  LORazepam   Injectable 3 milliGRAM(s) IntraMuscular once PRN severe anxiety/agitation  sodium chloride 0.65% Nasal 1 Spray(s) Both Nostrils daily PRN Nasal Congestion  
MEDICATIONS  (STANDING):  acetaminophen   Tablet .. 650 milliGRAM(s) Oral once  BACItracin   Ointment 1 Application(s) Topical two times a day  diVALproex ER 1500 milliGRAM(s) Oral at bedtime  influenza   Vaccine 0.5 milliLiter(s) IntraMuscular once  melatonin. 5 milliGRAM(s) Oral at bedtime  OLANZapine 30 milliGRAM(s) Oral at bedtime  zolpidem 5 milliGRAM(s) Oral at bedtime    MEDICATIONS  (PRN):  benzocaine 15 mG/menthol 3.6 mG (Sugar-Free) Lozenge 1 Lozenge Oral every 2 hours PRN sore throat  diphenhydrAMINE 50 milliGRAM(s) Oral every 4 hours PRN eps prophylaxis/agitation  diphenhydrAMINE   Injectable 50 milliGRAM(s) IntraMuscular once PRN eps ppx/agitation  haloperidol     Tablet 7.5 milliGRAM(s) Oral every 4 hours PRN psychotic agitation  haloperidol    Injectable 7.5 milliGRAM(s) IntraMuscular once PRN psychotic agitation  LORazepam     Tablet 3 milliGRAM(s) Oral every 4 hours PRN agitation/anxiety  LORazepam   Injectable 3 milliGRAM(s) IntraMuscular once PRN severe anxiety/agitation  polyethylene glycol 3350 17 Gram(s) Oral daily PRN constipation  sodium chloride 0.65% Nasal 1 Spray(s) Both Nostrils daily PRN Nasal Congestion  zolpidem 5 milliGRAM(s) Oral at bedtime PRN Insomnia  
MEDICATIONS  (STANDING):  benztropine 0.5 milliGRAM(s) Oral two times a day  influenza   Vaccine 0.5 milliLiter(s) IntraMuscular once  OLANZapine 15 milliGRAM(s) Oral at bedtime    MEDICATIONS  (PRN):  diphenhydrAMINE 50 milliGRAM(s) Oral every 4 hours PRN eps prophylaxis/agitation  diphenhydrAMINE   Injectable 50 milliGRAM(s) IntraMuscular once PRN eps ppx/agitation  haloperidol     Tablet 7.5 milliGRAM(s) Oral every 4 hours PRN psychotic agitation  haloperidol    Injectable 7.5 milliGRAM(s) IntraMuscular once PRN psychotic agitation  LORazepam     Tablet 3 milliGRAM(s) Oral every 4 hours PRN agitation/anxiety  LORazepam   Injectable 3 milliGRAM(s) IntraMuscular once PRN severe anxiety/agitation  
MEDICATIONS  (STANDING):  BACItracin   Ointment 1 Application(s) Topical two times a day  diVALproex  milliGRAM(s) Oral at bedtime  influenza   Vaccine 0.5 milliLiter(s) IntraMuscular once  lidocaine 2% Viscous 30 milliLiter(s) Swish and Spit once  OLANZapine 25 milliGRAM(s) Oral at bedtime    MEDICATIONS  (PRN):  benzocaine 15 mG/menthol 3.6 mG (Sugar-Free) Lozenge 1 Lozenge Oral every 2 hours PRN sore throat  diphenhydrAMINE 50 milliGRAM(s) Oral every 4 hours PRN eps prophylaxis/agitation  diphenhydrAMINE   Injectable 50 milliGRAM(s) IntraMuscular once PRN eps ppx/agitation  haloperidol     Tablet 7.5 milliGRAM(s) Oral every 4 hours PRN psychotic agitation  haloperidol    Injectable 7.5 milliGRAM(s) IntraMuscular once PRN psychotic agitation  LORazepam     Tablet 3 milliGRAM(s) Oral every 4 hours PRN agitation/anxiety  LORazepam   Injectable 3 milliGRAM(s) IntraMuscular once PRN severe anxiety/agitation  
MEDICATIONS  (STANDING):  acetaminophen   Tablet .. 650 milliGRAM(s) Oral once  BACItracin   Ointment 1 Application(s) Topical two times a day  diVALproex ER 1500 milliGRAM(s) Oral at bedtime  influenza   Vaccine 0.5 milliLiter(s) IntraMuscular once  melatonin. 5 milliGRAM(s) Oral at bedtime  OLANZapine 30 milliGRAM(s) Oral at bedtime    MEDICATIONS  (PRN):  benzocaine 15 mG/menthol 3.6 mG (Sugar-Free) Lozenge 1 Lozenge Oral every 2 hours PRN sore throat  diphenhydrAMINE 50 milliGRAM(s) Oral every 4 hours PRN eps prophylaxis/agitation  diphenhydrAMINE   Injectable 50 milliGRAM(s) IntraMuscular once PRN eps ppx/agitation  haloperidol     Tablet 7.5 milliGRAM(s) Oral every 4 hours PRN psychotic agitation  haloperidol    Injectable 7.5 milliGRAM(s) IntraMuscular once PRN psychotic agitation  LORazepam     Tablet 3 milliGRAM(s) Oral every 4 hours PRN agitation/anxiety  LORazepam   Injectable 3 milliGRAM(s) IntraMuscular once PRN severe anxiety/agitation  sodium chloride 0.65% Nasal 1 Spray(s) Both Nostrils daily PRN Nasal Congestion  
MEDICATIONS  (STANDING):  acetaminophen   Tablet .. 650 milliGRAM(s) Oral once  BACItracin   Ointment 1 Application(s) Topical two times a day  diVALproex ER 1500 milliGRAM(s) Oral at bedtime  influenza   Vaccine 0.5 milliLiter(s) IntraMuscular once  melatonin. 5 milliGRAM(s) Oral at bedtime  OLANZapine 30 milliGRAM(s) Oral at bedtime    MEDICATIONS  (PRN):  benzocaine 15 mG/menthol 3.6 mG (Sugar-Free) Lozenge 1 Lozenge Oral every 2 hours PRN sore throat  diphenhydrAMINE 50 milliGRAM(s) Oral every 4 hours PRN eps prophylaxis/agitation  diphenhydrAMINE   Injectable 50 milliGRAM(s) IntraMuscular once PRN eps ppx/agitation  haloperidol     Tablet 7.5 milliGRAM(s) Oral every 4 hours PRN psychotic agitation  haloperidol    Injectable 7.5 milliGRAM(s) IntraMuscular once PRN psychotic agitation  LORazepam     Tablet 3 milliGRAM(s) Oral every 4 hours PRN agitation/anxiety  LORazepam   Injectable 3 milliGRAM(s) IntraMuscular once PRN severe anxiety/agitation  polyethylene glycol 3350 17 Gram(s) Oral daily PRN constipation  sodium chloride 0.65% Nasal 1 Spray(s) Both Nostrils daily PRN Nasal Congestion  
MEDICATIONS  (STANDING):  BACItracin   Ointment 1 Application(s) Topical two times a day  clonazePAM  Tablet 1 milliGRAM(s) Oral at bedtime  influenza   Vaccine 0.5 milliLiter(s) IntraMuscular once  lithium SR (LITHOBID) 600 milliGRAM(s) Oral at bedtime  OLANZapine 20 milliGRAM(s) Oral at bedtime    MEDICATIONS  (PRN):  benzocaine 15 mG/menthol 3.6 mG (Sugar-Free) Lozenge 1 Lozenge Oral every 2 hours PRN sore throat  diphenhydrAMINE 50 milliGRAM(s) Oral every 4 hours PRN eps prophylaxis/agitation  diphenhydrAMINE   Injectable 50 milliGRAM(s) IntraMuscular once PRN eps ppx/agitation  haloperidol     Tablet 7.5 milliGRAM(s) Oral every 4 hours PRN psychotic agitation  haloperidol    Injectable 7.5 milliGRAM(s) IntraMuscular once PRN psychotic agitation  LORazepam     Tablet 3 milliGRAM(s) Oral every 4 hours PRN agitation/anxiety  LORazepam   Injectable 3 milliGRAM(s) IntraMuscular once PRN severe anxiety/agitation  
MEDICATIONS  (STANDING):  acetaminophen   Tablet .. 650 milliGRAM(s) Oral once  BACItracin   Ointment 1 Application(s) Topical two times a day  diVALproex ER 1500 milliGRAM(s) Oral at bedtime  influenza   Vaccine 0.5 milliLiter(s) IntraMuscular once  lidocaine 2% Viscous 30 milliLiter(s) Swish and Spit once  melatonin. 5 milliGRAM(s) Oral at bedtime  OLANZapine 30 milliGRAM(s) Oral at bedtime    MEDICATIONS  (PRN):  benzocaine 15 mG/menthol 3.6 mG (Sugar-Free) Lozenge 1 Lozenge Oral every 2 hours PRN sore throat  diphenhydrAMINE 50 milliGRAM(s) Oral every 4 hours PRN eps prophylaxis/agitation  diphenhydrAMINE   Injectable 50 milliGRAM(s) IntraMuscular once PRN eps ppx/agitation  haloperidol     Tablet 7.5 milliGRAM(s) Oral every 4 hours PRN psychotic agitation  haloperidol    Injectable 7.5 milliGRAM(s) IntraMuscular once PRN psychotic agitation  LORazepam     Tablet 3 milliGRAM(s) Oral every 4 hours PRN agitation/anxiety  LORazepam   Injectable 3 milliGRAM(s) IntraMuscular once PRN severe anxiety/agitation  sodium chloride 0.65% Nasal 1 Spray(s) Both Nostrils daily PRN Nasal Congestion  
MEDICATIONS  (STANDING):  acetaminophen   Tablet .. 650 milliGRAM(s) Oral once  BACItracin   Ointment 1 Application(s) Topical two times a day  diVALproex ER 1500 milliGRAM(s) Oral at bedtime  influenza   Vaccine 0.5 milliLiter(s) IntraMuscular once  melatonin. 5 milliGRAM(s) Oral at bedtime  OLANZapine 30 milliGRAM(s) Oral at bedtime  zolpidem 5 milliGRAM(s) Oral at bedtime    MEDICATIONS  (PRN):  benzocaine 15 mG/menthol 3.6 mG (Sugar-Free) Lozenge 1 Lozenge Oral every 2 hours PRN sore throat  diphenhydrAMINE 50 milliGRAM(s) Oral every 4 hours PRN eps prophylaxis/agitation  diphenhydrAMINE   Injectable 50 milliGRAM(s) IntraMuscular once PRN eps ppx/agitation  haloperidol     Tablet 7.5 milliGRAM(s) Oral every 4 hours PRN psychotic agitation  haloperidol    Injectable 7.5 milliGRAM(s) IntraMuscular once PRN psychotic agitation  hydrOXYzine hydrochloride 50 milliGRAM(s) Oral every 6 hours PRN anxiety  LORazepam     Tablet 2 milliGRAM(s) Oral every 8 hours PRN agitation/anxiety  LORazepam   Injectable 3 milliGRAM(s) IntraMuscular once PRN severe anxiety/agitation  polyethylene glycol 3350 17 Gram(s) Oral daily PRN constipation  sodium chloride 0.65% Nasal 1 Spray(s) Both Nostrils daily PRN Nasal Congestion  zolpidem 5 milliGRAM(s) Oral at bedtime PRN Insomnia  
MEDICATIONS  (STANDING):  benztropine 0.5 milliGRAM(s) Oral two times a day  influenza   Vaccine 0.5 milliLiter(s) IntraMuscular once  OLANZapine 15 milliGRAM(s) Oral at bedtime    MEDICATIONS  (PRN):  diphenhydrAMINE 50 milliGRAM(s) Oral every 4 hours PRN eps prophylaxis/agitation  diphenhydrAMINE   Injectable 50 milliGRAM(s) IntraMuscular once PRN eps ppx/agitation  haloperidol     Tablet 7.5 milliGRAM(s) Oral every 4 hours PRN psychotic agitation  haloperidol    Injectable 7.5 milliGRAM(s) IntraMuscular once PRN psychotic agitation  LORazepam     Tablet 3 milliGRAM(s) Oral every 4 hours PRN agitation/anxiety  LORazepam   Injectable 3 milliGRAM(s) IntraMuscular once PRN severe anxiety/agitation  
MEDICATIONS  (STANDING):  acetaminophen   Tablet .. 650 milliGRAM(s) Oral once  BACItracin   Ointment 1 Application(s) Topical two times a day  diVALproex ER 1500 milliGRAM(s) Oral at bedtime  influenza   Vaccine 0.5 milliLiter(s) IntraMuscular once  melatonin. 5 milliGRAM(s) Oral at bedtime  OLANZapine 30 milliGRAM(s) Oral at bedtime  zolpidem 5 milliGRAM(s) Oral at bedtime    MEDICATIONS  (PRN):  benzocaine 15 mG/menthol 3.6 mG (Sugar-Free) Lozenge 1 Lozenge Oral every 2 hours PRN sore throat  diphenhydrAMINE 50 milliGRAM(s) Oral every 4 hours PRN eps prophylaxis/agitation  diphenhydrAMINE   Injectable 50 milliGRAM(s) IntraMuscular once PRN eps ppx/agitation  haloperidol     Tablet 7.5 milliGRAM(s) Oral every 4 hours PRN psychotic agitation  haloperidol    Injectable 7.5 milliGRAM(s) IntraMuscular once PRN psychotic agitation  hydrOXYzine hydrochloride 50 milliGRAM(s) Oral every 6 hours PRN anxiety  LORazepam     Tablet 2 milliGRAM(s) Oral every 8 hours PRN agitation/anxiety  LORazepam   Injectable 3 milliGRAM(s) IntraMuscular once PRN severe anxiety/agitation  polyethylene glycol 3350 17 Gram(s) Oral daily PRN constipation  sodium chloride 0.65% Nasal 1 Spray(s) Both Nostrils daily PRN Nasal Congestion  zolpidem 5 milliGRAM(s) Oral at bedtime PRN Insomnia  
MEDICATIONS  (STANDING):  BACItracin   Ointment 1 Application(s) Topical two times a day  clonazePAM  Tablet 1 milliGRAM(s) Oral at bedtime  influenza   Vaccine 0.5 milliLiter(s) IntraMuscular once  lidocaine 2% Viscous 30 milliLiter(s) Swish and Spit once  lithium SR (LITHOBID) 600 milliGRAM(s) Oral at bedtime  OLANZapine 20 milliGRAM(s) Oral at bedtime    MEDICATIONS  (PRN):  benzocaine 15 mG/menthol 3.6 mG (Sugar-Free) Lozenge 1 Lozenge Oral every 2 hours PRN sore throat  diphenhydrAMINE 50 milliGRAM(s) Oral every 4 hours PRN eps prophylaxis/agitation  diphenhydrAMINE   Injectable 50 milliGRAM(s) IntraMuscular once PRN eps ppx/agitation  haloperidol     Tablet 7.5 milliGRAM(s) Oral every 4 hours PRN psychotic agitation  haloperidol    Injectable 7.5 milliGRAM(s) IntraMuscular once PRN psychotic agitation  LORazepam     Tablet 3 milliGRAM(s) Oral every 4 hours PRN agitation/anxiety  LORazepam   Injectable 3 milliGRAM(s) IntraMuscular once PRN severe anxiety/agitation  
MEDICATIONS  (STANDING):  BACItracin   Ointment 1 Application(s) Topical two times a day  clonazePAM  Tablet 1 milliGRAM(s) Oral at bedtime  influenza   Vaccine 0.5 milliLiter(s) IntraMuscular once  lidocaine 2% Viscous 30 milliLiter(s) Swish and Spit once  lithium SR (LITHOBID) 600 milliGRAM(s) Oral at bedtime  OLANZapine 20 milliGRAM(s) Oral at bedtime    MEDICATIONS  (PRN):  benzocaine 15 mG/menthol 3.6 mG (Sugar-Free) Lozenge 1 Lozenge Oral every 2 hours PRN sore throat  diphenhydrAMINE 50 milliGRAM(s) Oral every 4 hours PRN eps prophylaxis/agitation  diphenhydrAMINE   Injectable 50 milliGRAM(s) IntraMuscular once PRN eps ppx/agitation  haloperidol     Tablet 7.5 milliGRAM(s) Oral every 4 hours PRN psychotic agitation  haloperidol    Injectable 7.5 milliGRAM(s) IntraMuscular once PRN psychotic agitation  LORazepam     Tablet 3 milliGRAM(s) Oral every 4 hours PRN agitation/anxiety  LORazepam   Injectable 3 milliGRAM(s) IntraMuscular once PRN severe anxiety/agitation  
MEDICATIONS  (STANDING):  BACItracin   Ointment 1 Application(s) Topical two times a day  benztropine 0.5 milliGRAM(s) Oral two times a day  clonazePAM  Tablet 1 milliGRAM(s) Oral at bedtime  influenza   Vaccine 0.5 milliLiter(s) IntraMuscular once  OLANZapine 20 milliGRAM(s) Oral at bedtime    MEDICATIONS  (PRN):  benzocaine 15 mG/menthol 3.6 mG (Sugar-Free) Lozenge 1 Lozenge Oral every 2 hours PRN sore throat  diphenhydrAMINE 50 milliGRAM(s) Oral every 4 hours PRN eps prophylaxis/agitation  diphenhydrAMINE   Injectable 50 milliGRAM(s) IntraMuscular once PRN eps ppx/agitation  haloperidol     Tablet 7.5 milliGRAM(s) Oral every 4 hours PRN psychotic agitation  haloperidol    Injectable 7.5 milliGRAM(s) IntraMuscular once PRN psychotic agitation  LORazepam     Tablet 3 milliGRAM(s) Oral every 4 hours PRN agitation/anxiety  LORazepam   Injectable 3 milliGRAM(s) IntraMuscular once PRN severe anxiety/agitation  
MEDICATIONS  (STANDING):  acetaminophen   Tablet .. 650 milliGRAM(s) Oral once  BACItracin   Ointment 1 Application(s) Topical two times a day  diVALproex ER 1500 milliGRAM(s) Oral at bedtime  influenza   Vaccine 0.5 milliLiter(s) IntraMuscular once  melatonin. 5 milliGRAM(s) Oral at bedtime  OLANZapine 30 milliGRAM(s) Oral at bedtime    MEDICATIONS  (PRN):  benzocaine 15 mG/menthol 3.6 mG (Sugar-Free) Lozenge 1 Lozenge Oral every 2 hours PRN sore throat  diphenhydrAMINE 50 milliGRAM(s) Oral every 4 hours PRN eps prophylaxis/agitation  diphenhydrAMINE   Injectable 50 milliGRAM(s) IntraMuscular once PRN eps ppx/agitation  haloperidol     Tablet 7.5 milliGRAM(s) Oral every 4 hours PRN psychotic agitation  haloperidol    Injectable 7.5 milliGRAM(s) IntraMuscular once PRN psychotic agitation  LORazepam     Tablet 3 milliGRAM(s) Oral every 4 hours PRN agitation/anxiety  LORazepam   Injectable 3 milliGRAM(s) IntraMuscular once PRN severe anxiety/agitation  polyethylene glycol 3350 17 Gram(s) Oral daily PRN constipation  sodium chloride 0.65% Nasal 1 Spray(s) Both Nostrils daily PRN Nasal Congestion  
MEDICATIONS  (STANDING):  BACItracin   Ointment 1 Application(s) Topical two times a day  diVALproex ER 1500 milliGRAM(s) Oral at bedtime  influenza   Vaccine 0.5 milliLiter(s) IntraMuscular once  melatonin. 5 milliGRAM(s) Oral at bedtime  OLANZapine 30 milliGRAM(s) Oral at bedtime  zolpidem 5 milliGRAM(s) Oral at bedtime    MEDICATIONS  (PRN):  acetaminophen   Tablet .. 650 milliGRAM(s) Oral every 6 hours PRN Mild Pain (1 - 3), Moderate Pain (4 - 6)  benzocaine 15 mG/menthol 3.6 mG (Sugar-Free) Lozenge 1 Lozenge Oral every 2 hours PRN sore throat  diphenhydrAMINE 50 milliGRAM(s) Oral every 4 hours PRN eps prophylaxis/agitation  diphenhydrAMINE   Injectable 50 milliGRAM(s) IntraMuscular once PRN eps ppx/agitation  haloperidol     Tablet 7.5 milliGRAM(s) Oral every 4 hours PRN psychotic agitation  haloperidol    Injectable 7.5 milliGRAM(s) IntraMuscular once PRN psychotic agitation  hydrOXYzine hydrochloride 50 milliGRAM(s) Oral every 6 hours PRN anxiety  LORazepam     Tablet 2 milliGRAM(s) Oral every 8 hours PRN agitation/anxiety  LORazepam   Injectable 3 milliGRAM(s) IntraMuscular once PRN severe anxiety/agitation  polyethylene glycol 3350 17 Gram(s) Oral daily PRN constipation  sodium chloride 0.65% Nasal 1 Spray(s) Both Nostrils daily PRN Nasal Congestion  zolpidem 5 milliGRAM(s) Oral at bedtime PRN Insomnia  
MEDICATIONS  (STANDING):  benztropine 0.5 milliGRAM(s) Oral two times a day  clonazePAM  Tablet 1 milliGRAM(s) Oral at bedtime  influenza   Vaccine 0.5 milliLiter(s) IntraMuscular once  OLANZapine 20 milliGRAM(s) Oral at bedtime    MEDICATIONS  (PRN):  diphenhydrAMINE 50 milliGRAM(s) Oral every 4 hours PRN eps prophylaxis/agitation  diphenhydrAMINE   Injectable 50 milliGRAM(s) IntraMuscular once PRN eps ppx/agitation  haloperidol     Tablet 7.5 milliGRAM(s) Oral every 4 hours PRN psychotic agitation  haloperidol    Injectable 7.5 milliGRAM(s) IntraMuscular once PRN psychotic agitation  LORazepam     Tablet 3 milliGRAM(s) Oral every 4 hours PRN agitation/anxiety  LORazepam   Injectable 3 milliGRAM(s) IntraMuscular once PRN severe anxiety/agitation  
MEDICATIONS  (STANDING):  acetaminophen   Tablet .. 650 milliGRAM(s) Oral once  BACItracin   Ointment 1 Application(s) Topical two times a day  diVALproex ER 1500 milliGRAM(s) Oral at bedtime  influenza   Vaccine 0.5 milliLiter(s) IntraMuscular once  melatonin. 5 milliGRAM(s) Oral at bedtime  OLANZapine 30 milliGRAM(s) Oral at bedtime  zolpidem 5 milliGRAM(s) Oral at bedtime    MEDICATIONS  (PRN):  benzocaine 15 mG/menthol 3.6 mG (Sugar-Free) Lozenge 1 Lozenge Oral every 2 hours PRN sore throat  diphenhydrAMINE 50 milliGRAM(s) Oral every 4 hours PRN eps prophylaxis/agitation  diphenhydrAMINE   Injectable 50 milliGRAM(s) IntraMuscular once PRN eps ppx/agitation  haloperidol     Tablet 7.5 milliGRAM(s) Oral every 4 hours PRN psychotic agitation  haloperidol    Injectable 7.5 milliGRAM(s) IntraMuscular once PRN psychotic agitation  LORazepam     Tablet 3 milliGRAM(s) Oral every 4 hours PRN agitation/anxiety  LORazepam   Injectable 3 milliGRAM(s) IntraMuscular once PRN severe anxiety/agitation  polyethylene glycol 3350 17 Gram(s) Oral daily PRN constipation  sodium chloride 0.65% Nasal 1 Spray(s) Both Nostrils daily PRN Nasal Congestion  zolpidem 5 milliGRAM(s) Oral at bedtime PRN Insomnia  
MEDICATIONS  (STANDING):  BACItracin   Ointment 1 Application(s) Topical two times a day  clonazePAM  Tablet 1 milliGRAM(s) Oral at bedtime  influenza   Vaccine 0.5 milliLiter(s) IntraMuscular once  lidocaine 2% Viscous 30 milliLiter(s) Swish and Spit once  lithium SR (LITHOBID) 600 milliGRAM(s) Oral at bedtime  OLANZapine 20 milliGRAM(s) Oral at bedtime    MEDICATIONS  (PRN):  benzocaine 15 mG/menthol 3.6 mG (Sugar-Free) Lozenge 1 Lozenge Oral every 2 hours PRN sore throat  diphenhydrAMINE 50 milliGRAM(s) Oral every 4 hours PRN eps prophylaxis/agitation  diphenhydrAMINE   Injectable 50 milliGRAM(s) IntraMuscular once PRN eps ppx/agitation  haloperidol     Tablet 7.5 milliGRAM(s) Oral every 4 hours PRN psychotic agitation  haloperidol    Injectable 7.5 milliGRAM(s) IntraMuscular once PRN psychotic agitation  LORazepam     Tablet 3 milliGRAM(s) Oral every 4 hours PRN agitation/anxiety  LORazepam   Injectable 3 milliGRAM(s) IntraMuscular once PRN severe anxiety/agitation  
MEDICATIONS  (STANDING):  acetaminophen   Tablet .. 650 milliGRAM(s) Oral once  BACItracin   Ointment 1 Application(s) Topical two times a day  diVALproex ER 1500 milliGRAM(s) Oral at bedtime  influenza   Vaccine 0.5 milliLiter(s) IntraMuscular once  melatonin. 5 milliGRAM(s) Oral at bedtime  OLANZapine 30 milliGRAM(s) Oral at bedtime    MEDICATIONS  (PRN):  benzocaine 15 mG/menthol 3.6 mG (Sugar-Free) Lozenge 1 Lozenge Oral every 2 hours PRN sore throat  diphenhydrAMINE 50 milliGRAM(s) Oral every 4 hours PRN eps prophylaxis/agitation  diphenhydrAMINE   Injectable 50 milliGRAM(s) IntraMuscular once PRN eps ppx/agitation  haloperidol     Tablet 7.5 milliGRAM(s) Oral every 4 hours PRN psychotic agitation  haloperidol    Injectable 7.5 milliGRAM(s) IntraMuscular once PRN psychotic agitation  LORazepam     Tablet 3 milliGRAM(s) Oral every 4 hours PRN agitation/anxiety  LORazepam   Injectable 3 milliGRAM(s) IntraMuscular once PRN severe anxiety/agitation  polyethylene glycol 3350 17 Gram(s) Oral daily PRN constipation  sodium chloride 0.65% Nasal 1 Spray(s) Both Nostrils daily PRN Nasal Congestion

## 2021-02-10 NOTE — BH INPATIENT PSYCHIATRY PROGRESS NOTE - PRN MEDS
MEDICATIONS  (PRN):  benzocaine 15 mG/menthol 3.6 mG (Sugar-Free) Lozenge 1 Lozenge Oral every 2 hours PRN sore throat  diphenhydrAMINE 50 milliGRAM(s) Oral every 4 hours PRN eps prophylaxis/agitation  diphenhydrAMINE   Injectable 50 milliGRAM(s) IntraMuscular once PRN eps ppx/agitation  haloperidol     Tablet 7.5 milliGRAM(s) Oral every 4 hours PRN psychotic agitation  haloperidol    Injectable 7.5 milliGRAM(s) IntraMuscular once PRN psychotic agitation  LORazepam     Tablet 3 milliGRAM(s) Oral every 4 hours PRN agitation/anxiety  LORazepam   Injectable 3 milliGRAM(s) IntraMuscular once PRN severe anxiety/agitation  
MEDICATIONS  (PRN):  benzocaine 15 mG/menthol 3.6 mG (Sugar-Free) Lozenge 1 Lozenge Oral every 2 hours PRN sore throat  diphenhydrAMINE 50 milliGRAM(s) Oral every 4 hours PRN eps prophylaxis/agitation  diphenhydrAMINE   Injectable 50 milliGRAM(s) IntraMuscular once PRN eps ppx/agitation  haloperidol     Tablet 7.5 milliGRAM(s) Oral every 4 hours PRN psychotic agitation  haloperidol    Injectable 7.5 milliGRAM(s) IntraMuscular once PRN psychotic agitation  LORazepam     Tablet 3 milliGRAM(s) Oral every 4 hours PRN agitation/anxiety  LORazepam   Injectable 3 milliGRAM(s) IntraMuscular once PRN severe anxiety/agitation  polyethylene glycol 3350 17 Gram(s) Oral daily PRN constipation  sodium chloride 0.65% Nasal 1 Spray(s) Both Nostrils daily PRN Nasal Congestion  
MEDICATIONS  (PRN):  diphenhydrAMINE 50 milliGRAM(s) Oral every 4 hours PRN eps prophylaxis/agitation  diphenhydrAMINE   Injectable 50 milliGRAM(s) IntraMuscular once PRN eps ppx/agitation  haloperidol     Tablet 7.5 milliGRAM(s) Oral every 4 hours PRN psychotic agitation  haloperidol    Injectable 7.5 milliGRAM(s) IntraMuscular once PRN psychotic agitation  LORazepam     Tablet 3 milliGRAM(s) Oral every 4 hours PRN agitation/anxiety  LORazepam   Injectable 3 milliGRAM(s) IntraMuscular once PRN severe anxiety/agitation  
MEDICATIONS  (PRN):  benzocaine 15 mG/menthol 3.6 mG (Sugar-Free) Lozenge 1 Lozenge Oral every 2 hours PRN sore throat  diphenhydrAMINE 50 milliGRAM(s) Oral every 4 hours PRN eps prophylaxis/agitation  diphenhydrAMINE   Injectable 50 milliGRAM(s) IntraMuscular once PRN eps ppx/agitation  haloperidol     Tablet 7.5 milliGRAM(s) Oral every 4 hours PRN psychotic agitation  haloperidol    Injectable 7.5 milliGRAM(s) IntraMuscular once PRN psychotic agitation  hydrOXYzine hydrochloride 50 milliGRAM(s) Oral every 6 hours PRN anxiety  LORazepam     Tablet 2 milliGRAM(s) Oral every 8 hours PRN agitation/anxiety  LORazepam   Injectable 3 milliGRAM(s) IntraMuscular once PRN severe anxiety/agitation  polyethylene glycol 3350 17 Gram(s) Oral daily PRN constipation  sodium chloride 0.65% Nasal 1 Spray(s) Both Nostrils daily PRN Nasal Congestion  zolpidem 5 milliGRAM(s) Oral at bedtime PRN Insomnia  
MEDICATIONS  (PRN):  diphenhydrAMINE 50 milliGRAM(s) Oral every 4 hours PRN eps prophylaxis/agitation  diphenhydrAMINE   Injectable 50 milliGRAM(s) IntraMuscular once PRN eps ppx/agitation  haloperidol     Tablet 7.5 milliGRAM(s) Oral every 4 hours PRN psychotic agitation  haloperidol    Injectable 7.5 milliGRAM(s) IntraMuscular once PRN psychotic agitation  LORazepam     Tablet 3 milliGRAM(s) Oral every 4 hours PRN agitation/anxiety  LORazepam   Injectable 3 milliGRAM(s) IntraMuscular once PRN severe anxiety/agitation  
MEDICATIONS  (PRN):  benzocaine 15 mG/menthol 3.6 mG (Sugar-Free) Lozenge 1 Lozenge Oral every 2 hours PRN sore throat  diphenhydrAMINE 50 milliGRAM(s) Oral every 4 hours PRN eps prophylaxis/agitation  diphenhydrAMINE   Injectable 50 milliGRAM(s) IntraMuscular once PRN eps ppx/agitation  haloperidol     Tablet 7.5 milliGRAM(s) Oral every 4 hours PRN psychotic agitation  haloperidol    Injectable 7.5 milliGRAM(s) IntraMuscular once PRN psychotic agitation  LORazepam     Tablet 3 milliGRAM(s) Oral every 4 hours PRN agitation/anxiety  LORazepam   Injectable 3 milliGRAM(s) IntraMuscular once PRN severe anxiety/agitation  
MEDICATIONS  (PRN):  benzocaine 15 mG/menthol 3.6 mG (Sugar-Free) Lozenge 1 Lozenge Oral every 2 hours PRN sore throat  diphenhydrAMINE 50 milliGRAM(s) Oral every 4 hours PRN eps prophylaxis/agitation  diphenhydrAMINE   Injectable 50 milliGRAM(s) IntraMuscular once PRN eps ppx/agitation  haloperidol     Tablet 7.5 milliGRAM(s) Oral every 4 hours PRN psychotic agitation  haloperidol    Injectable 7.5 milliGRAM(s) IntraMuscular once PRN psychotic agitation  LORazepam     Tablet 3 milliGRAM(s) Oral every 4 hours PRN agitation/anxiety  LORazepam   Injectable 3 milliGRAM(s) IntraMuscular once PRN severe anxiety/agitation  sodium chloride 0.65% Nasal 1 Spray(s) Both Nostrils daily PRN Nasal Congestion  
MEDICATIONS  (PRN):  diphenhydrAMINE 50 milliGRAM(s) Oral every 4 hours PRN eps prophylaxis/agitation  diphenhydrAMINE   Injectable 50 milliGRAM(s) IntraMuscular once PRN eps ppx/agitation  haloperidol     Tablet 7.5 milliGRAM(s) Oral every 4 hours PRN psychotic agitation  haloperidol    Injectable 7.5 milliGRAM(s) IntraMuscular once PRN psychotic agitation  LORazepam     Tablet 3 milliGRAM(s) Oral every 4 hours PRN agitation/anxiety  LORazepam   Injectable 3 milliGRAM(s) IntraMuscular once PRN severe anxiety/agitation  
MEDICATIONS  (PRN):  benzocaine 15 mG/menthol 3.6 mG (Sugar-Free) Lozenge 1 Lozenge Oral every 2 hours PRN sore throat  diphenhydrAMINE 50 milliGRAM(s) Oral every 4 hours PRN eps prophylaxis/agitation  diphenhydrAMINE   Injectable 50 milliGRAM(s) IntraMuscular once PRN eps ppx/agitation  haloperidol     Tablet 7.5 milliGRAM(s) Oral every 4 hours PRN psychotic agitation  haloperidol    Injectable 7.5 milliGRAM(s) IntraMuscular once PRN psychotic agitation  LORazepam     Tablet 3 milliGRAM(s) Oral every 4 hours PRN agitation/anxiety  LORazepam   Injectable 3 milliGRAM(s) IntraMuscular once PRN severe anxiety/agitation  
MEDICATIONS  (PRN):  benzocaine 15 mG/menthol 3.6 mG (Sugar-Free) Lozenge 1 Lozenge Oral every 2 hours PRN sore throat  diphenhydrAMINE 50 milliGRAM(s) Oral every 4 hours PRN eps prophylaxis/agitation  diphenhydrAMINE   Injectable 50 milliGRAM(s) IntraMuscular once PRN eps ppx/agitation  haloperidol     Tablet 7.5 milliGRAM(s) Oral every 4 hours PRN psychotic agitation  haloperidol    Injectable 7.5 milliGRAM(s) IntraMuscular once PRN psychotic agitation  LORazepam     Tablet 3 milliGRAM(s) Oral every 4 hours PRN agitation/anxiety  LORazepam   Injectable 3 milliGRAM(s) IntraMuscular once PRN severe anxiety/agitation  sodium chloride 0.65% Nasal 1 Spray(s) Both Nostrils daily PRN Nasal Congestion  
MEDICATIONS  (PRN):  benzocaine 15 mG/menthol 3.6 mG (Sugar-Free) Lozenge 1 Lozenge Oral every 2 hours PRN sore throat  diphenhydrAMINE 50 milliGRAM(s) Oral every 4 hours PRN eps prophylaxis/agitation  diphenhydrAMINE   Injectable 50 milliGRAM(s) IntraMuscular once PRN eps ppx/agitation  haloperidol     Tablet 7.5 milliGRAM(s) Oral every 4 hours PRN psychotic agitation  haloperidol    Injectable 7.5 milliGRAM(s) IntraMuscular once PRN psychotic agitation  LORazepam     Tablet 3 milliGRAM(s) Oral every 4 hours PRN agitation/anxiety  LORazepam   Injectable 3 milliGRAM(s) IntraMuscular once PRN severe anxiety/agitation  polyethylene glycol 3350 17 Gram(s) Oral daily PRN constipation  sodium chloride 0.65% Nasal 1 Spray(s) Both Nostrils daily PRN Nasal Congestion  
MEDICATIONS  (PRN):  benzocaine 15 mG/menthol 3.6 mG (Sugar-Free) Lozenge 1 Lozenge Oral every 2 hours PRN sore throat  diphenhydrAMINE 50 milliGRAM(s) Oral every 4 hours PRN eps prophylaxis/agitation  diphenhydrAMINE   Injectable 50 milliGRAM(s) IntraMuscular once PRN eps ppx/agitation  haloperidol     Tablet 7.5 milliGRAM(s) Oral every 4 hours PRN psychotic agitation  haloperidol    Injectable 7.5 milliGRAM(s) IntraMuscular once PRN psychotic agitation  LORazepam     Tablet 3 milliGRAM(s) Oral every 4 hours PRN agitation/anxiety  LORazepam   Injectable 3 milliGRAM(s) IntraMuscular once PRN severe anxiety/agitation  polyethylene glycol 3350 17 Gram(s) Oral daily PRN constipation  sodium chloride 0.65% Nasal 1 Spray(s) Both Nostrils daily PRN Nasal Congestion  zolpidem 5 milliGRAM(s) Oral at bedtime PRN Insomnia  
MEDICATIONS  (PRN):  acetaminophen   Tablet .. 650 milliGRAM(s) Oral every 6 hours PRN Mild Pain (1 - 3), Moderate Pain (4 - 6)  benzocaine 15 mG/menthol 3.6 mG (Sugar-Free) Lozenge 1 Lozenge Oral every 2 hours PRN sore throat  diphenhydrAMINE 50 milliGRAM(s) Oral every 4 hours PRN eps prophylaxis/agitation  diphenhydrAMINE   Injectable 50 milliGRAM(s) IntraMuscular once PRN eps ppx/agitation  haloperidol     Tablet 7.5 milliGRAM(s) Oral every 4 hours PRN psychotic agitation  haloperidol    Injectable 7.5 milliGRAM(s) IntraMuscular once PRN psychotic agitation  hydrOXYzine hydrochloride 50 milliGRAM(s) Oral every 6 hours PRN anxiety  LORazepam     Tablet 2 milliGRAM(s) Oral every 8 hours PRN agitation/anxiety  LORazepam   Injectable 3 milliGRAM(s) IntraMuscular once PRN severe anxiety/agitation  polyethylene glycol 3350 17 Gram(s) Oral daily PRN constipation  sodium chloride 0.65% Nasal 1 Spray(s) Both Nostrils daily PRN Nasal Congestion  zolpidem 5 milliGRAM(s) Oral at bedtime PRN Insomnia  
MEDICATIONS  (PRN):  benzocaine 15 mG/menthol 3.6 mG (Sugar-Free) Lozenge 1 Lozenge Oral every 2 hours PRN sore throat  diphenhydrAMINE 50 milliGRAM(s) Oral every 4 hours PRN eps prophylaxis/agitation  diphenhydrAMINE   Injectable 50 milliGRAM(s) IntraMuscular once PRN eps ppx/agitation  haloperidol     Tablet 7.5 milliGRAM(s) Oral every 4 hours PRN psychotic agitation  haloperidol    Injectable 7.5 milliGRAM(s) IntraMuscular once PRN psychotic agitation  LORazepam     Tablet 3 milliGRAM(s) Oral every 4 hours PRN agitation/anxiety  LORazepam   Injectable 3 milliGRAM(s) IntraMuscular once PRN severe anxiety/agitation  sodium chloride 0.65% Nasal 1 Spray(s) Both Nostrils daily PRN Nasal Congestion  
MEDICATIONS  (PRN):  benzocaine 15 mG/menthol 3.6 mG (Sugar-Free) Lozenge 1 Lozenge Oral every 2 hours PRN sore throat  diphenhydrAMINE 50 milliGRAM(s) Oral every 4 hours PRN eps prophylaxis/agitation  diphenhydrAMINE   Injectable 50 milliGRAM(s) IntraMuscular once PRN eps ppx/agitation  haloperidol     Tablet 7.5 milliGRAM(s) Oral every 4 hours PRN psychotic agitation  haloperidol    Injectable 7.5 milliGRAM(s) IntraMuscular once PRN psychotic agitation  LORazepam     Tablet 3 milliGRAM(s) Oral every 4 hours PRN agitation/anxiety  LORazepam   Injectable 3 milliGRAM(s) IntraMuscular once PRN severe anxiety/agitation  polyethylene glycol 3350 17 Gram(s) Oral daily PRN constipation  sodium chloride 0.65% Nasal 1 Spray(s) Both Nostrils daily PRN Nasal Congestion  zolpidem 5 milliGRAM(s) Oral at bedtime PRN Insomnia  
MEDICATIONS  (PRN):  benzocaine 15 mG/menthol 3.6 mG (Sugar-Free) Lozenge 1 Lozenge Oral every 2 hours PRN sore throat  diphenhydrAMINE 50 milliGRAM(s) Oral every 4 hours PRN eps prophylaxis/agitation  diphenhydrAMINE   Injectable 50 milliGRAM(s) IntraMuscular once PRN eps ppx/agitation  haloperidol     Tablet 7.5 milliGRAM(s) Oral every 4 hours PRN psychotic agitation  haloperidol    Injectable 7.5 milliGRAM(s) IntraMuscular once PRN psychotic agitation  hydrOXYzine hydrochloride 50 milliGRAM(s) Oral every 6 hours PRN anxiety  LORazepam     Tablet 2 milliGRAM(s) Oral every 8 hours PRN agitation/anxiety  LORazepam   Injectable 3 milliGRAM(s) IntraMuscular once PRN severe anxiety/agitation  polyethylene glycol 3350 17 Gram(s) Oral daily PRN constipation  sodium chloride 0.65% Nasal 1 Spray(s) Both Nostrils daily PRN Nasal Congestion  zolpidem 5 milliGRAM(s) Oral at bedtime PRN Insomnia  
MEDICATIONS  (PRN):  benzocaine 15 mG/menthol 3.6 mG (Sugar-Free) Lozenge 1 Lozenge Oral every 2 hours PRN sore throat  diphenhydrAMINE 50 milliGRAM(s) Oral every 4 hours PRN eps prophylaxis/agitation  diphenhydrAMINE   Injectable 50 milliGRAM(s) IntraMuscular once PRN eps ppx/agitation  haloperidol     Tablet 7.5 milliGRAM(s) Oral every 4 hours PRN psychotic agitation  haloperidol    Injectable 7.5 milliGRAM(s) IntraMuscular once PRN psychotic agitation  LORazepam     Tablet 3 milliGRAM(s) Oral every 4 hours PRN agitation/anxiety  LORazepam   Injectable 3 milliGRAM(s) IntraMuscular once PRN severe anxiety/agitation  
MEDICATIONS  (PRN):  benzocaine 15 mG/menthol 3.6 mG (Sugar-Free) Lozenge 1 Lozenge Oral every 2 hours PRN sore throat  diphenhydrAMINE 50 milliGRAM(s) Oral every 4 hours PRN eps prophylaxis/agitation  diphenhydrAMINE   Injectable 50 milliGRAM(s) IntraMuscular once PRN eps ppx/agitation  haloperidol     Tablet 7.5 milliGRAM(s) Oral every 4 hours PRN psychotic agitation  haloperidol    Injectable 7.5 milliGRAM(s) IntraMuscular once PRN psychotic agitation  LORazepam     Tablet 3 milliGRAM(s) Oral every 4 hours PRN agitation/anxiety  LORazepam   Injectable 3 milliGRAM(s) IntraMuscular once PRN severe anxiety/agitation  polyethylene glycol 3350 17 Gram(s) Oral daily PRN constipation  sodium chloride 0.65% Nasal 1 Spray(s) Both Nostrils daily PRN Nasal Congestion

## 2021-02-10 NOTE — BH INPATIENT PSYCHIATRY PROGRESS NOTE - NSCGISEVERILLNESS_PSY_ALL_CORE
3 = Mildly ill – clearly established symptoms with minimal, if any, distress or difficulty in social and occupational function
5 = Markedly ill - intrusive symptoms that distinctly impair social/occupational function or cause intrusive levels of distress

## 2021-02-10 NOTE — BH INPATIENT PSYCHIATRY PROGRESS NOTE - NSCGIIMPROVESX_PSY_ALL_CORE
BG values reviewed.  Patient doing well with control. No hypoglycemia noted.  Elevations noted after dinner when taking 1 unit per CHO choice.  May do better with 1 unit per 15 grams + 1 unit at breakfast and dinner, continue to use 1 unit per CHO choice at lunch.  Email back to patient.    Chucky Jaquez,  Sorry to hear about your mother.  I know that this can be a stressful time and this also figures into your diabetes control.  Overall you are doing very well.  I would say that for insulin dosing, I would recommend that you try 1 unit per carb choice for lunch  and 1 unit per carb choice +1 unit at breakfast and supper.    So for breakfast and supper if you are having 3 carb choices take 4 units of Novolog.   If you are having 4 carb choices, take 5 units of Novolog.    For lunch if you are having 3 carb choices, take 3 units of Novolog.  Let me know if you have questions.  Other wise please send me your blood glucose values in 2-3 weeks.   If you can include some 2 hr after the meal values once you are home again that would be great.   Take care,    Melissa Garza RN  BSN CDE    
E-mail from patient:    Melissa,    Attached is a PDF file of my current glucose readings over the past 2 weeks.  It's been a bit all over the map this past week because of irregular meal times due to schedule and travel.  I'm back in [place], [place], with my mother, as she remains in hospice.  It has been challenging this past week to accurately determine my carb intake, as evidenced by some of the readings, as well as having the opportunity to take post-meal readings at the 2-hour post-meal interval.  I will attempt to do better this week (sigh).    Thanks,  Leonid Campbell      
3 = Minimally improved - slightly better with little or no clinically meaningful reduction of symptoms.  Represents very little change in basic clinical status, level of care, or functional capacity.
2 = Much improved - notably better with signficant reduction of symptoms; increase in the level of functioning but some symptoms remain

## 2021-02-10 NOTE — BH INPATIENT PSYCHIATRY PROGRESS NOTE - NSBHCHARTREVIEWVS_PSY_A_CORE FT
Vital Signs Last 24 Hrs  T(C): 36.3 (31 Jan 2021 18:12), Max: 36.3 (31 Jan 2021 18:12)  T(F): 97.3 (31 Jan 2021 18:12), Max: 97.3 (31 Jan 2021 18:12)  HR: --  BP: --  BP(mean): --  RR: --  SpO2: --
Vital Signs Last 24 Hrs  T(C): --  T(F): --  HR: --  BP: --  BP(mean): --  RR: --  SpO2: --
Vital Signs Last 24 Hrs  T(C): 36.8 (17 Jan 2021 06:53), Max: 36.8 (17 Jan 2021 06:53)  T(F): 98.2 (17 Jan 2021 06:53), Max: 98.2 (17 Jan 2021 06:53)  HR: --  BP: --  BP(mean): --  RR: --  SpO2: --
Vital Signs Last 24 Hrs  T(C): --  T(F): --  HR: --  BP: --  BP(mean): --  RR: --  SpO2: --
Vital Signs Last 24 Hrs  T(C): 36.9 (28 Jan 2021 18:11), Max: 36.9 (28 Jan 2021 18:11)  T(F): 98.4 (28 Jan 2021 18:11), Max: 98.4 (28 Jan 2021 18:11)  HR: --  BP: --  BP(mean): --  RR: --  SpO2: --
Vital Signs Last 24 Hrs  T(C): --  T(F): --  HR: 90 (20 Jan 2021 08:46) (90 - 90)  BP: 143/66 (20 Jan 2021 08:46) (143/66 - 143/66)  BP(mean): --  RR: --  SpO2: --
Vital Signs Last 24 Hrs  T(C): 36.4 (04 Feb 2021 17:34), Max: 36.4 (04 Feb 2021 17:34)  T(F): 97.6 (04 Feb 2021 17:34), Max: 97.6 (04 Feb 2021 17:34)  HR: --  BP: --  BP(mean): --  RR: --  SpO2: --
Vital Signs Last 24 Hrs  T(C): --  T(F): --  HR: --  BP: --  BP(mean): --  RR: --  SpO2: --
Vital Signs Last 24 Hrs  T(C): --  T(F): --  HR: --  BP: --  BP(mean): --  RR: --  SpO2: --
Vital Signs Last 24 Hrs  T(C): 36.6 (21 Jan 2021 19:30), Max: 36.6 (21 Jan 2021 19:30)  T(F): 97.9 (21 Jan 2021 19:30), Max: 97.9 (21 Jan 2021 19:30)  HR: --  BP: --  BP(mean): --  RR: --  SpO2: --
Vital Signs Last 24 Hrs  T(C): 36.3 (04 Feb 2021 07:53), Max: 36.5 (03 Feb 2021 19:45)  T(F): 97.3 (04 Feb 2021 07:53), Max: 97.7 (03 Feb 2021 19:45)  HR: --  BP: --  BP(mean): --  RR: --  SpO2: --
Vital Signs Last 24 Hrs  T(C): --  T(F): --  HR: --  BP: --  BP(mean): --  RR: --  SpO2: --
Vital Signs Last 24 Hrs  T(C): 36.6 (16 Jan 2021 08:40), Max: 36.6 (15 Sumit 2021 19:21)  T(F): 97.8 (16 Jan 2021 08:40), Max: 97.9 (15 Sumit 2021 19:21)  HR: --  BP: --  BP(mean): --  RR: --  SpO2: --
Vital Signs Last 24 Hrs  T(C): 36.6 (19 Jan 2021 07:04), Max: 36.6 (19 Jan 2021 07:04)  T(F): 97.8 (19 Jan 2021 07:04), Max: 97.8 (19 Jan 2021 07:04)  HR: --  BP: --  BP(mean): --  RR: --  SpO2: --
Vital Signs Last 24 Hrs  T(C): --  T(F): --  HR: --  BP: --  BP(mean): --  RR: --  SpO2: --
Vital Signs Last 24 Hrs  T(C): 36.4 (08 Feb 2021 08:22), Max: 36.4 (07 Feb 2021 18:52)  T(F): 97.6 (08 Feb 2021 08:22), Max: 97.6 (07 Feb 2021 18:52)  HR: 78 (08 Feb 2021 08:22) (78 - 78)  BP: 141/66 (08 Feb 2021 08:22) (141/66 - 141/66)  BP(mean): --  RR: --  SpO2: --
Pt refused  
Vital Signs Last 24 Hrs  T(C): --  T(F): --  HR: --  BP: --  BP(mean): --  RR: --  SpO2: --
Vital Signs Last 24 Hrs  T(C): 36.4 (04 Feb 2021 17:34), Max: 36.4 (04 Feb 2021 17:34)  T(F): 97.6 (04 Feb 2021 17:34), Max: 97.6 (04 Feb 2021 17:34)  HR: --  BP: --  BP(mean): --  RR: --  SpO2: --
Vital Signs Last 24 Hrs  T(C): 37 (09 Feb 2021 19:16), Max: 37 (09 Feb 2021 19:16)  T(F): 98.6 (09 Feb 2021 19:16), Max: 98.6 (09 Feb 2021 19:16)  HR: --  BP: --  BP(mean): --  RR: --  SpO2: --

## 2021-02-10 NOTE — BH INPATIENT PSYCHIATRY PROGRESS NOTE - NSBHASSESSSUMMFT_PSY_ALL_CORE
Pt is 27 yo M hx of bipolar d/o vs schizophrenia vs schizoaffective presenting to [facility] w/ worsening agitation, getting into arguments with family and father, not sleeping, irritable. Pt remains irritable, especially when discussing his father. He is oddly-related with intense stare, aggressive demeanor – ASPD traits. Requiring intermittent prns for agitation towards peers/staff. C/w Zyprexa 30mg, c/w  Depakote ER for mood and sedation. Pt requires inpatient hospitalization for safety, stabilization and medication optimization.      Patient seen individually for discharge day management.   Patient’s admission history and course of treatment as above.    Suicide and risk assessment performed prior to discharge. The patient has a low acute risk and low chronic risk of self-harm and aggression towards others. Protective factors include denying SI, no SIB, denying HI, good social supports in their family, no substance abuse, no current mood symptoms, no hopelessness, future-oriented in returning to home, no access to firearms.  Risk factors include presenting illness. Immediate risk was minimized by inpatient admission to a safe environment with appropriate supervision and limited access to lethal means. Future risk was minimized before discharge by treatment of acute episode, maximizing outpatient support, providing relevant patient education, discussing emergency procedures, and ensuring close follow-up. The patient remains at a low risk of self-harm, and such risk cannot be further ameliorated by continued inpatient treatment and the patient is therefore appropriate for discharge.       There were no behavioral problems on the unit.  Patient did not become agitated and did not require emergent intramuscular medications or seclusion / restraints.  Patient did not self-harm on the unit.  Patient remained actively engaged in treatment.  Patient participated in individual, group, and milieu therapy.  Patient got along appropriately with staff and peers.   Patient did not have any medical problems during this hospitalization.  There were no medical consultations.    A full discussion of the factors that predict treatment success and relapse was held including safety planning.  A discussion of the risks and benefits of patient’s medication was held including a discussion of the metabolic risks and risk of EPS and TD was done       The patient has improved significantly and no longer requires inpatient treatment and care. Patient denies all suicidal and aggressive ideation, intent and plan. Patient denies anxiety symptoms and panic attacks. Patient is not judged to be an acute danger to self or others at this time. Patient will be discharged today to home and outpatient follow up.          A full discussion of the factors that predict treatment success and relapse was held including safety planning.  A discussion of the risks and benefits of patient’s medication was held including a discussion of the metabolic risks and risk of EPS and TD was done   The patient has improved significantly and no longer requires inpatient treatment and care. Patient denies all suicidal and aggressive ideation, intent and plan. Patient denies anxiety symptoms and panic attacks. Patient is not judged to be an acute danger to self or others at this time. Patient will be discharged today to home and outpatient follow up.           - Psych: Zyprexa 30mg, and Depakote 1500 ER  at 1500qhs. 3/7.5/50 for agitation  - Medical: NTD
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
## 2021-02-10 NOTE — BH INPATIENT PSYCHIATRY PROGRESS NOTE - NSICDXBHSECONDARYDX_PSY_ALL_CORE
Bipolar 1 disorder   F31.9  

## 2021-02-10 NOTE — BH INPATIENT PSYCHIATRY PROGRESS NOTE - NSBHMETABOLICLABS_PSY_ALL_CORE
Labs within last 12 months

## 2021-02-10 NOTE — BH INPATIENT PSYCHIATRY PROGRESS NOTE - NSTXDCOPNODATETRGT_PSY_ALL_CORE
12-Jan-2021
29-Jan-2021
12-Jan-2021

## 2021-02-10 NOTE — BH INPATIENT PSYCHIATRY PROGRESS NOTE - NSTXDCOPNODATEEST_PSY_ALL_CORE
15-Usmit-2021
15-Sumit-2021
15-Smuit-2021
15-Sumit-2021
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
## 2021-02-10 NOTE — BH INPATIENT PSYCHIATRY PROGRESS NOTE - NSTXMEDICDATEEST_PSY_ALL_CORE
15-Sumit-2021
21-Jan-2021
15-Sumit-2021
15-Sumit-2021
21-Jan-2021
15-Sumit-2021
21-Jan-2021
15-Sumit-2021
21-Jan-2021
15-Sumit-2021

## 2021-02-10 NOTE — BH INPATIENT PSYCHIATRY PROGRESS NOTE - NSBHCONTPROVIDER_PSY_ALL_CORE
Not applicable

## 2021-02-10 NOTE — BH INPATIENT PSYCHIATRY PROGRESS NOTE - NSBHMETABOLIC_PSY_ALL_CORE_FT
BMI:   HbA1c: A1C with Estimated Average Glucose Result: 4.8 % (01-15-21 @ 09:03)    Glucose:   BP: --  Lipid Panel: Date/Time: 01-15-21 @ 09:03  Cholesterol, Serum: 133  Direct LDL: --  HDL Cholesterol, Serum: 47  Total Cholesterol/HDL Ration Measurement: --  Triglycerides, Serum: 59  
BMI:   HbA1c: A1C with Estimated Average Glucose Result: 4.8 % (01-15-21 @ 09:03)    Glucose:   BP: 143/66 (01-20-21 @ 08:46) (143/66 - 143/66)  Lipid Panel: Date/Time: 01-15-21 @ 09:03  Cholesterol, Serum: 133  Direct LDL: --  HDL Cholesterol, Serum: 47  Total Cholesterol/HDL Ration Measurement: --  Triglycerides, Serum: 59  
BMI:   HbA1c: A1C with Estimated Average Glucose Result: 4.8 % (01-15-21 @ 09:03)    Glucose:   BP: --  Lipid Panel: Date/Time: 01-15-21 @ 09:03  Cholesterol, Serum: 133  Direct LDL: --  HDL Cholesterol, Serum: 47  Total Cholesterol/HDL Ration Measurement: --  Triglycerides, Serum: 59  
BMI:   HbA1c: A1C with Estimated Average Glucose Result: 4.8 % (01-15-21 @ 09:03)    Glucose:   BP: 143/66 (01-20-21 @ 08:46) (143/66 - 143/66)  Lipid Panel: Date/Time: 01-15-21 @ 09:03  Cholesterol, Serum: 133  Direct LDL: --  HDL Cholesterol, Serum: 47  Total Cholesterol/HDL Ration Measurement: --  Triglycerides, Serum: 59  
BMI:   HbA1c: A1C with Estimated Average Glucose Result: 4.8 % (01-15-21 @ 09:03)    Glucose:   BP: 135/83 (01-15-21 @ 06:53) (135/83 - 170/93)  Lipid Panel: Date/Time: 01-15-21 @ 09:03  Cholesterol, Serum: 133  Direct LDL: --  HDL Cholesterol, Serum: 47  Total Cholesterol/HDL Ration Measurement: --  Triglycerides, Serum: 59  
BMI:   HbA1c: A1C with Estimated Average Glucose Result: 4.8 % (01-15-21 @ 09:03)    Glucose:   BP: 135/83 (01-15-21 @ 06:53) (135/83 - 170/93)  Lipid Panel: Date/Time: 01-15-21 @ 09:03  Cholesterol, Serum: 133  Direct LDL: --  HDL Cholesterol, Serum: 47  Total Cholesterol/HDL Ration Measurement: --  Triglycerides, Serum: 59  
BMI:   HbA1c: A1C with Estimated Average Glucose Result: 4.8 % (01-15-21 @ 09:03)    Glucose:   BP: --  Lipid Panel: Date/Time: 01-15-21 @ 09:03  Cholesterol, Serum: 133  Direct LDL: --  HDL Cholesterol, Serum: 47  Total Cholesterol/HDL Ration Measurement: --  Triglycerides, Serum: 59  
BMI:   HbA1c: A1C with Estimated Average Glucose Result: 4.8 % (01-15-21 @ 09:03)    Glucose:   BP: 143/66 (01-20-21 @ 08:46) (143/66 - 143/66)  Lipid Panel: Date/Time: 01-15-21 @ 09:03  Cholesterol, Serum: 133  Direct LDL: --  HDL Cholesterol, Serum: 47  Total Cholesterol/HDL Ration Measurement: --  Triglycerides, Serum: 59  
BMI:   HbA1c: A1C with Estimated Average Glucose Result: 4.8 % (01-15-21 @ 09:03)    Glucose:   BP: 141/71 (01-23-21 @ 08:38) (141/71 - 141/71)  Lipid Panel: Date/Time: 01-15-21 @ 09:03  Cholesterol, Serum: 133  Direct LDL: --  HDL Cholesterol, Serum: 47  Total Cholesterol/HDL Ration Measurement: --  Triglycerides, Serum: 59  
BMI:   HbA1c: A1C with Estimated Average Glucose Result: 4.8 % (01-15-21 @ 09:03)    Glucose:   BP: --  Lipid Panel: Date/Time: 01-15-21 @ 09:03  Cholesterol, Serum: 133  Direct LDL: --  HDL Cholesterol, Serum: 47  Total Cholesterol/HDL Ration Measurement: --  Triglycerides, Serum: 59  
BMI:   HbA1c: A1C with Estimated Average Glucose Result: 4.8 % (01-15-21 @ 09:03)    Glucose:   BP: 141/66 (02-08-21 @ 08:22) (101/65 - 147/60)  Lipid Panel: Date/Time: 01-15-21 @ 09:03  Cholesterol, Serum: 133  Direct LDL: --  HDL Cholesterol, Serum: 47  Total Cholesterol/HDL Ration Measurement: --  Triglycerides, Serum: 59  
BMI:   HbA1c: A1C with Estimated Average Glucose Result: 4.8 % (01-15-21 @ 09:03)    Glucose:   BP: 140/82 (02-09-21 @ 06:21) (140/82 - 141/66)  Lipid Panel: Date/Time: 01-15-21 @ 09:03  Cholesterol, Serum: 133  Direct LDL: --  HDL Cholesterol, Serum: 47  Total Cholesterol/HDL Ration Measurement: --  Triglycerides, Serum: 59

## 2021-02-10 NOTE — BH INPATIENT PSYCHIATRY PROGRESS NOTE - NSTXDCOPNOPROGRES_PSY_ALL_CORE
No Change

## 2021-02-25 NOTE — SOCIAL WORK POST DISCHARGE FOLLOW UP NOTE - NSBHSWFOLLOWUP_PSY_ALL_CORE_FT
SW was advised pt did not show for [facility] f/u appt @ 2/18,  called several times father's cell# as he instructed clinic to give his contact # to link with pt  [phone], # is busy signal, unable to leave message to offer assistance to lucas appt. Will send letter to home

## 2021-07-06 ENCOUNTER — OUTPATIENT (OUTPATIENT)
Dept: OUTPATIENT SERVICES | Facility: HOSPITAL | Age: 26
LOS: 1 days | Discharge: HOME | End: 2021-07-06
Payer: MEDICAID

## 2021-07-06 PROCEDURE — 99214 OFFICE O/P EST MOD 30 MIN: CPT

## 2021-08-06 DIAGNOSIS — F25.9 SCHIZOAFFECTIVE DISORDER, UNSPECIFIED: ICD-10-CM

## 2022-03-02 ENCOUNTER — OUTPATIENT (OUTPATIENT)
Dept: OUTPATIENT SERVICES | Facility: HOSPITAL | Age: 27
LOS: 1 days | Discharge: HOME | End: 2022-03-02

## 2022-03-03 DIAGNOSIS — F25.9 SCHIZOAFFECTIVE DISORDER, UNSPECIFIED: ICD-10-CM

## 2022-05-04 ENCOUNTER — OUTPATIENT (OUTPATIENT)
Dept: OUTPATIENT SERVICES | Facility: HOSPITAL | Age: 27
LOS: 1 days | Discharge: STILL ADMITTED OR ADMINISTRATIVE | End: 2022-05-04

## 2022-05-04 DIAGNOSIS — F25.0 SCHIZOAFFECTIVE DISORDER, BIPOLAR TYPE: ICD-10-CM

## 2022-05-04 PROCEDURE — 99214 OFFICE O/P EST MOD 30 MIN: CPT | Mod: 1L,95

## 2023-02-14 NOTE — BH INPATIENT PSYCHIATRY ASSESSMENT NOTE - NSBHMETABOLIC_PSY_ALL_CORE_FT
regular rate and rhythm BMI:   HbA1c: A1C with Estimated Average Glucose Result: 4.8 % (01-15-21 @ 09:03)    Glucose:   BP: 135/83 (01-15-21 @ 06:53) (135/83 - 170/93)  Lipid Panel: Date/Time: 01-15-21 @ 09:03  Cholesterol, Serum: 133  Direct LDL: --  HDL Cholesterol, Serum: 47  Total Cholesterol/HDL Ration Measurement: --  Triglycerides, Serum: 59

## 2023-12-19 NOTE — ED BEHAVIORAL HEALTH ASSESSMENT NOTE - REMOTE MEMORY
Detail Level: Zone Initiate Treatment: metronidazole 0.75 % topical cream Once a day\\nQuantity: 45.0 g  Days Supply: 30\\nSig: Apply thin film topically to face once a day. Continue Regimen: clobetasol 0.05 % topical ointment bid prn flares\\n\\nUse once a week for maintenance Other

## 2024-08-26 NOTE — PSYCHIATRIC REHAB INITIAL EVALUATION - LIVES WITH
parent(s) Patient has a permanent  residence, however, has been living in a hotel for the past two weeks/alone/parent(s) 26-Aug-2024 17:20